# Patient Record
Sex: MALE | Race: WHITE | NOT HISPANIC OR LATINO | Employment: FULL TIME | URBAN - NONMETROPOLITAN AREA
[De-identification: names, ages, dates, MRNs, and addresses within clinical notes are randomized per-mention and may not be internally consistent; named-entity substitution may affect disease eponyms.]

---

## 2017-04-11 ENCOUNTER — HOSPITAL ENCOUNTER (EMERGENCY)
Facility: HOSPITAL | Age: 27
Discharge: HOME | End: 2017-04-11
Attending: NURSE PRACTITIONER | Admitting: NURSE PRACTITIONER
Payer: COMMERCIAL

## 2017-04-11 VITALS
TEMPERATURE: 97 F | HEART RATE: 73 BPM | DIASTOLIC BLOOD PRESSURE: 82 MMHG | SYSTOLIC BLOOD PRESSURE: 137 MMHG | OXYGEN SATURATION: 98 % | RESPIRATION RATE: 16 BRPM

## 2017-04-11 DIAGNOSIS — S39.012A STRAIN OF LUMBAR REGION, INITIAL ENCOUNTER: ICD-10-CM

## 2017-04-11 DIAGNOSIS — M62.838 MUSCLE SPASM: ICD-10-CM

## 2017-04-11 PROCEDURE — 99213 OFFICE O/P EST LOW 20 MIN: CPT | Performed by: NURSE PRACTITIONER

## 2017-04-11 PROCEDURE — 72100 X-RAY EXAM L-S SPINE 2/3 VWS: CPT | Mod: TC

## 2017-04-11 PROCEDURE — 25000128 H RX IP 250 OP 636: Performed by: NURSE PRACTITIONER

## 2017-04-11 PROCEDURE — 99214 OFFICE O/P EST MOD 30 MIN: CPT | Mod: 25

## 2017-04-11 PROCEDURE — 96372 THER/PROPH/DIAG INJ SC/IM: CPT

## 2017-04-11 RX ORDER — KETOROLAC TROMETHAMINE 10 MG/1
10 TABLET, FILM COATED ORAL EVERY 6 HOURS PRN
Qty: 20 TABLET | Refills: 0 | Status: SHIPPED | OUTPATIENT
Start: 2017-04-11 | End: 2017-05-31

## 2017-04-11 RX ORDER — CYCLOBENZAPRINE HCL 10 MG
10 TABLET ORAL 3 TIMES DAILY PRN
Qty: 15 TABLET | Refills: 0 | Status: SHIPPED | OUTPATIENT
Start: 2017-04-11 | End: 2017-04-17

## 2017-04-11 RX ORDER — KETOROLAC TROMETHAMINE 30 MG/ML
60 INJECTION, SOLUTION INTRAMUSCULAR; INTRAVENOUS ONCE
Status: COMPLETED | OUTPATIENT
Start: 2017-04-11 | End: 2017-04-11

## 2017-04-11 RX ADMIN — KETOROLAC TROMETHAMINE 60 MG: 30 INJECTION, SOLUTION INTRAMUSCULAR; INTRAVENOUS at 12:20

## 2017-04-11 ASSESSMENT — ENCOUNTER SYMPTOMS
CHILLS: 0
APPETITE CHANGE: 0
PSYCHIATRIC NEGATIVE: 1
TROUBLE SWALLOWING: 0
DIARRHEA: 0
FEVER: 0
COUGH: 0
VOMITING: 0
BACK PAIN: 1
ACTIVITY CHANGE: 1
NAUSEA: 0
FLANK PAIN: 0
DYSURIA: 0

## 2017-04-11 NOTE — LETTER
[facility]  [address] [address]  Phone: [phone]    April 11, 2017        Roberto Meza  [address] [place] [id number]          To whom it may concern:    RE: Roberto TOBIAS Meza    Patient was seen and treated today at our clinic.    Please excuse from work on 4-11-17.       Sincerely,        AIYANA Lopez  4/11/2017  1:02 PM  [facility]

## 2017-04-11 NOTE — ED NOTES
No known injury states back has been sore/stiff for past few days and woke up today with increased pain rates pain 8/10 no meds taken for pain

## 2017-04-11 NOTE — ED AVS SNAPSHOT
[facility]    [address] [place] [id number]    Phone:  [phone]                                       Roberto Meza   MRN: [id number]    Department:  [facility]   Date of Visit:  4/11/2017           After Visit Summary Signature Page     I have received my discharge instructions, and my questions have been answered. I have discussed any challenges I see with this plan with the nurse or doctor.    ..........................................................................................................................................  Patient/Patient Representative Signature      ..........................................................................................................................................  Patient Representative Print Name and Relationship to Patient    ..................................................               ................................................  Date                                            Time    ..........................................................................................................................................  Reviewed by Signature/Title    ...................................................              ..............................................  Date                                                            Time

## 2017-04-11 NOTE — DISCHARGE INSTRUCTIONS
Take Toradol as ordered for pain. NO additional NSAIDS (ibuprfen, aspirin, naprosyn, etc..)  when taking.   Take Flexeril as directed for muscle spasm. Do not drive or participate in activities that require alertness when taking.   Apply ice to lower back for 20 minutes every 1-2 hours. Protect skin.   Work note.   Follow up with PCP in 10 days.   Return to urgent care or emergency department with any increase in symptoms or concerns.

## 2017-04-11 NOTE — ED PROVIDER NOTES
History     Chief Complaint   Patient presents with     Back Pain     lumbar back, started yesterday, worse today, no known injury     The history is provided by the patient. No  was used.     Roberto Meza is a 26 year old male who presents with lower back pain that started 2 days ago. Pain started when he was walking. Pain started out as stiffness. Pain has been coming and going. He's been seeing a chiropractor off and on for his back. His last visit to the chiropractor was this am. No previous back injury. Denies injury or trauma. Denies numbness or tingling down legs. Nothing seems to make the pain better or worse. Denies fever, chills, or night sweats. Eating and drinking well. Bowel and bladder are working well. Denies incontinence of bowel or bladder. Denies saddle paresthesias. He works as a .     I have reviewed the Medications, Allergies, Past Medical and Surgical History, and Social History in the Epic system.    Review of Systems   Constitutional: Positive for activity change. Negative for appetite change, chills and fever.   HENT: Negative for congestion and trouble swallowing.    Respiratory: Negative for cough.    Gastrointestinal: Negative for diarrhea, nausea and vomiting.   Genitourinary: Negative for dysuria and flank pain.   Musculoskeletal: Positive for back pain.   Skin: Negative for rash.   Psychiatric/Behavioral: Negative.        Physical Exam   BP: 137/82  Pulse: 73  Temp: 97  F (36.1  C)  Resp: 16  SpO2: 98 %  Physical Exam   Constitutional: He is oriented to person, place, and time. He appears well-developed and well-nourished. No distress.   HENT:   Head: Normocephalic.   Mouth/Throat: Oropharynx is clear and moist. No oropharyngeal exudate.   Neck: Normal range of motion. Neck supple.   Cardiovascular: Normal rate, regular rhythm, normal heart sounds and intact distal pulses.    No murmur heard.  Pulmonary/Chest: Effort normal. No  respiratory distress. He has no wheezes. He has no rales.   Abdominal: Soft. He exhibits no distension. There is no tenderness. There is no rebound and no guarding.   Genitourinary:   Genitourinary Comments: Negative bilateral CVA tenderness.    Musculoskeletal: He exhibits tenderness. He exhibits no edema or deformity.   ROM and CMS intact to bilateral lower extremities. No step offs or tenderness to spinal column. No erythema, rash, or bruising to posterior back. Tenderness on palpation to right lateral L4-L5 region. Pain is reproducible. Flexion and extension intact to back. Bilateral dorsalis pedal pulses +2.    Lymphadenopathy:     He has no cervical adenopathy.   Neurological: He is alert and oriented to person, place, and time.   Skin: Skin is warm and dry. No rash noted. He is not diaphoretic.   Psychiatric: He has a normal mood and affect. His behavior is normal.   Nursing note and vitals reviewed.      ED Course     ED Course     Procedures  I personally reviewed the x-rays and there is NO fracture or dislocation. Radiology review pending and nurse will notify patient if there is any change in the treatment plan.    Medications   ketorolac (TORADOL) injection 60 mg (60 mg Intramuscular Given 4/11/17 1220)     Monitored for 20 minutes and tolerated well.       Assessments & Plan (with Medical Decision Making)     Discussed plan of care. He verbalized understanding. All questions answered.     I have reviewed the nursing notes.    I have reviewed the findings, diagnosis, plan and need for follow up with the patient.  Discharged in stable condition.     Discharge Medication List as of 4/11/2017  1:02 PM      START taking these medications    Details   cyclobenzaprine (FLEXERIL) 10 MG tablet Take 1 tablet (10 mg) by mouth 3 times daily as needed for muscle spasms, Disp-15 tablet, R-0, E-Prescribe      ketorolac (TORADOL) 10 MG tablet Take 1 tablet (10 mg) by mouth every 6 hours as needed for moderate pain,  Disp-20 tablet, R-0, E-Prescribe             Final diagnoses:   Strain of lumbar region, initial encounter   Muscle spasm     Take Toradol as ordered for pain. NO additional NSAIDS (ibuprfen, aspirin, naprosyn, etc..)  when taking.   Take Flexeril as directed for muscle spasm. Do not drive or participate in activities that require alertness when taking.   Apply ice to lower back for 20 minutes every 1-2 hours. Protect skin.   Work note.   Follow up with PCP in 10 days.   Return to urgent care or emergency department with any increase in symptoms or concerns.     AIYANA Lopez  4/11/2017  11:31 AM  [facility]       Kat Juan, BRADEN  04/18/17 [id number]

## 2017-05-31 ENCOUNTER — OFFICE VISIT (OUTPATIENT)
Dept: FAMILY MEDICINE | Facility: OTHER | Age: 27
End: 2017-05-31
Attending: NURSE PRACTITIONER
Payer: COMMERCIAL

## 2017-05-31 VITALS
OXYGEN SATURATION: 97 % | BODY MASS INDEX: 26.42 KG/M2 | RESPIRATION RATE: 18 BRPM | WEIGHT: 217 LBS | TEMPERATURE: 98.9 F | HEART RATE: 91 BPM | SYSTOLIC BLOOD PRESSURE: 112 MMHG | HEIGHT: 76 IN | DIASTOLIC BLOOD PRESSURE: 72 MMHG

## 2017-05-31 DIAGNOSIS — M54.50 BILATERAL LOW BACK PAIN WITHOUT SCIATICA, UNSPECIFIED CHRONICITY: Primary | ICD-10-CM

## 2017-05-31 PROCEDURE — 99213 OFFICE O/P EST LOW 20 MIN: CPT | Performed by: NURSE PRACTITIONER

## 2017-05-31 RX ORDER — PREDNISONE 20 MG/1
40 TABLET ORAL DAILY
Qty: 10 TABLET | Refills: 0 | Status: SHIPPED | OUTPATIENT
Start: 2017-05-31 | End: 2017-06-05

## 2017-05-31 ASSESSMENT — PAIN SCALES - GENERAL: PAINLEVEL: MILD PAIN (3)

## 2017-05-31 NOTE — PROGRESS NOTES
SUBJECTIVE:                                                    Roberto Meza is a 26 year old male who presents to clinic today for the following health issues:      Back Pain      Duration: years, worse in last month        Specific cause: laying and leaning over make it worse    Description:   Location of pain: low back bilateral and middle of back bilateral  Character of pain: sharp, stabbing, burning and intermittent  Pain radiation:none  New numbness or weakness in legs, not attributed to pain:  no     Intensity: Currently 3/10, At its worst 7/10    History:   Pain interferes with job: YES, machanic  History of back problems: recurrent self limited episodes of low back pain in the past  Any previous MRI or X-rays: in past not sure where  Sees a specialist for back pain:  Chiropractor  Therapies tried without relief: has flexeril not taking, toradol helped but out and NSAIDs    Alleviating factors:   Improved by: moving around and better posture  Heat helps       Precipitating factors:  Worsened by: Lying Flat    Functional and Psychosocial Screen (Jane STarT Back):      Not performed today       Accompanying Signs & Symptoms:  Risk of Fracture:  None  Risk of Cauda Equina:  None  Risk of Infection:  None  Risk of Cancer:  None  Risk of Ankylosing Spondylitis:  Onset at age <35, male, AND morning back stiffness. no                          Problem list and histories reviewed & adjusted, as indicated.  Additional history: as documented    Patient Active Problem List   Diagnosis     Cat bite     Past Surgical History:   Procedure Laterality Date     growth removal[  2001    left leg       Social History   Substance Use Topics     Smoking status: Current Some Day Smoker     Packs/day: 0.25     Types: Pipe     Smokeless tobacco: Not on file     Alcohol use No      Comment: drinks once a week     History reviewed. No pertinent family history.      No current outpatient prescriptions on file.     Allergies  "  Allergen Reactions     Bees        Reviewed and updated as needed this visit by clinical staff  Tobacco  Allergies  Meds  Med Hx  Surg Hx  Fam Hx  Soc Hx      Reviewed and updated as needed this visit by Provider         ROS:  C: NEGATIVE for fever, chills, change in weight  INTEGUMENTARY/SKIN: NEGATIVE for worrisome rashes, moles or lesions  E/M: NEGATIVE for ear, mouth and throat problems  R: NEGATIVE for significant cough or SOB  CV: NEGATIVE for chest pain, palpitations or peripheral edema  GI: NEGATIVE for nausea, abdominal pain, heartburn, or change in bowel habits  : negative for dysuria, hematuria, decreased urinary stream, erectile dysfunction  MUSCULOSKELETAL: low back pain  NEURO: NEGATIVE for weakness, dizziness or paresthesias  PSYCHIATRIC: NEGATIVE for changes in mood or affect    OBJECTIVE:                                                    /72 (BP Location: Left arm, Patient Position: Chair, Cuff Size: Adult Large)  Pulse 91  Temp 98.9  F (37.2  C) (Tympanic)  Resp 18  Ht 6' 4\" (1.93 m)  Wt 217 lb (98.4 kg)  SpO2 97%  BMI 26.41 kg/m2  Body mass index is 26.41 kg/(m^2).   GENERAL: healthy, alert and no distress  NECK: no adenopathy, no asymmetry, masses, or scars and thyroid normal to palpation  RESP: lungs clear to auscultation - no rales, rhonchi or wheezes  CV: regular rate and rhythm, normal S1 S2, no S3 or S4, no murmur, click or rub, no peripheral edema and peripheral pulses strong  ABDOMEN: soft, nontender, no hepatosplenomegaly, no masses and bowel sounds normal  MS: no edema, peripheral pulses normal, tenderness to palpation bilateral lower back and gait normal, no ataxia  SKIN: no suspicious lesions or rashes  NEURO: Normal strength and tone, mentation intact and speech normal  Comprehensive back pain exam:  Tenderness of bilateral lower back not on the spine, Range of motion not limited by pain, Lower extremity strength functional and equal on both sides and able to " walk on heels and toes and Straight leg raise negative bilaterally  PSYCH: mentation appears normal, affect normal/bright    Diagnostic Test Results:  Results for orders placed or performed during the hospital encounter of 04/11/17   Lumbar spine XR, 2-3 views    Narrative    LUMBAR SPINE SERIES    FINDINGS:  There is straightening of the usual lumbar lordosis.  There  is mild-to-moderate narrowing of the L5-S1 disk space.  There is grade  1 retrolisthesis of L5 compared to S1.  There is no evidence for  spondylolysis.  The remainder of the disk spaces are normally  maintained.  There is no evidence for fracture.  There is spina bifida  occulta of S1.  The sacroiliac joints appear normally maintained.    IMPRESSION:  NARROWING OF THE L5-S1 DISK SPACE WITH GRADE 1  RETROLISTHESIS OF L5 COMPARED TO S1.  Exam Date: Apr 11, 2017 11:47:09 AM  Author: JOSE HUNTER  This report is final and signed          ASSESSMENT:                                                        PLAN:                                                    ASSESSMENT / PLAN:  (M54.5) Bilateral low back pain without sciatica, unspecified chronicity  (primary encounter diagnosis)  Comment:   Plan:  predniSONE (DELTASONE) 20 MG tablet          -Daily stretching - try some yoga -  -Prednisone 40 mg daily for 5 days   -Can take tylenol up to 3000 mg daily  -Do not take advil or other NSAID while on prednisone when done with prednisone can take ibuprofen 600- 800 mg 3 times a day  -Ice and heat 3 -4 times a day   -try a massage  -can also go back chiropractor   -consider physical therapy if other options do not work        Follow up in a couple weeks if no improvement           Jes Fried, GEORGET[facility]

## 2017-05-31 NOTE — PATIENT INSTRUCTIONS
Back Care Tips    Caring for your back  These are things you can do to prevent a recurrence of acute back pain and to reduce symptoms from chronic back pain:    Maintain a healthy weight. If you are overweight, losing weight will help most types of back pain.    Exercise is an important part of recovery from most types of back pain. The back is supported by the muscles behind and in front of the spine. This means both the back muscles and the abdominal muscles must be strengthened to provide better support for your spine.     Swimming and brisk walking are good overall exercises to improve your fitness level.    Practice safe lifting methods (below).    Practice good posture when sitting, standing and walking. Avoid prolonged sitting. This puts more stress on the lower back than standing or walking.    Wear quality shoes with sufficient arch support. Foot and ankle alignment can affect back symptoms. Women should avoid high heels.    Therapeutic massage can help  relax the back muscles without stretching them.    During the first 24 to 72 hours after an acute injury or flare-up of chronic back pain, apply an ice pack to the painful area for 20 minutes and then remove it for 20 minutes over a period of 60 to 90 minutes or several times a day. As a safety precaution, do not use a heating pad at bedtime. Sleeping on a heating pad can lead to skin burns or tissue damage.    Ice and heat therapies can be alternated.  Medications  Talk to your doctor before using medications, especially if you have other medical problems or are taking other medicines.    You may use acetaminophen or ibuprofen to control pain, unless other pain medicine was prescribed. If you have chronic conditions like diabetes, liver or kidney disease, stomach ulcers or gastrointestinal bleeding, or are taking blood thinners, talk with your doctor before taking any meidcations.    Be careful if you are given prescription pain medicines, narcotics, or  medication for muscle spasm. They can cause drowsiness, affect your coordination, reflexes and judgment. Do not drive or operate heavy machinery.  Lumbar stretch  Here is a simple stretching exercise that will help relax muscle spasm and keep your back more limber. If exercise makes your back pain worse, don t do it.    Lie on your back with your knees bent and both feet on the ground.    Slowly raise your left knee to your chest as you flatten your lower back against the floor. Hold for 5 seconds.    Relax and repeat the exercise with your right knee.    Do 10 of these exercises for each leg.  Safe lifting method    Don t bend over at the waist to lift an object off the floor.  Instead, bend your knees and hips in a squat.     Keep your back and head upright    Hold the object close to your body, directly in front of you.    Straighten your legs to lift the object.     Lower the object to the floor in the reverse fashion.    If you must slide something across the floor, push it.  Posture tips  Sitting  Sit in chairs with straight backs or low-back support. rKeep your k nees lower than your hip, with your feet flat on the floor.  When driving, sit up straight. Adjust the seat forward so you are not leaning toward the steering wheel.  A small pillow or rolled towel behind your lower back may help if you are driving long distances.   Standing  When standing for long periods, shift most of your weight to one leg at a time. Alternate legs every few minutes.   Sleeping  The best way to sleep is on your side with your knees bent. Put a low pillow under your head to support your neck in a neutral spine position. Avoid thick pillows that bend your neck to one side. Put a pillow between your legs to further relax your lower back. If you sleep on your back, put pillows under your knees to support your legs in a slightly flexed position. Use a firm mattress. If your mattress sags, replace it, or use a 1/2-inch plywood board  under the mattress to add support.  Follow-up care  Follow up with your health care provider or as directed by our staff.  If X-rays, a CT scan or an MRI scan were taken, they will be reviewed by a radiologist. You will be notified of any new findings that may affect your care.  Call 911  Seek emergency medical care if any of the following occur:    Trouble breathing    Confusion    Very drowsy or trouble breathing    Fainting or loss of consciousness    Rapid or very slow heart rate    Loss of  bwel or bladder control  When to seek medical care  Call your health care provider if any of the following occur:    Pain becomes worse or spreads to your arms or legs    Weakness or numbness in one or both arms or legs    Numbness in the groin area    [id number] [employer]. [address] [place] [address], [place] [address]. All rights reserved. This information is not intended as a substitute for professional medical care. Always follow your healthcare professional's instructions.    -Daily stretching - try some yoga -  -Prednisone 40 mg daily for 5 days   -Can take tylenol up to 3000 mg daily  -Do not take advil or other NSAID while on prednisone when done with prednisone can take ibuprofen 600- 800 mg 3 times a day  -Ice and heat 3 -4 times a day   -try a massage  -can also go back chiropractor   -consider physical therapy if other options do not work

## 2017-05-31 NOTE — MR AVS SNAPSHOT
After Visit Summary   5/31/2017    Roberto Meza    MRN: [id number]           Patient Information     Date Of Birth          1990        Visit Information        Provider Department      5/31/2017 1:00 PM Jes Fried APRN [facility]        Today's Diagnoses     Bilateral low back pain without sciatica, unspecified chronicity    -  1      Care Instructions      Back Care Tips    Caring for your back  These are things you can do to prevent a recurrence of acute back pain and to reduce symptoms from chronic back pain:    Maintain a healthy weight. If you are overweight, losing weight will help most types of back pain.    Exercise is an important part of recovery from most types of back pain. The back is supported by the muscles behind and in front of the spine. This means both the back muscles and the abdominal muscles must be strengthened to provide better support for your spine.     Swimming and brisk walking are good overall exercises to improve your fitness level.    Practice safe lifting methods (below).    Practice good posture when sitting, standing and walking. Avoid prolonged sitting. This puts more stress on the lower back than standing or walking.    Wear quality shoes with sufficient arch support. Foot and ankle alignment can affect back symptoms. Women should avoid high heels.    Therapeutic massage can help  relax the back muscles without stretching them.    During the first 24 to 72 hours after an acute injury or flare-up of chronic back pain, apply an ice pack to the painful area for 20 minutes and then remove it for 20 minutes over a period of 60 to 90 minutes or several times a day. As a safety precaution, do not use a heating pad at bedtime. Sleeping on a heating pad can lead to skin burns or tissue damage.    Ice and heat therapies can be alternated.  Medications  Talk to your doctor before using medications, especially if you have other medical  problems or are taking other medicines.    You may use acetaminophen or ibuprofen to control pain, unless other pain medicine was prescribed. If you have chronic conditions like diabetes, liver or kidney disease, stomach ulcers or gastrointestinal bleeding, or are taking blood thinners, talk with your doctor before taking any meidcations.    Be careful if you are given prescription pain medicines, narcotics, or medication for muscle spasm. They can cause drowsiness, affect your coordination, reflexes and judgment. Do not drive or operate heavy machinery.  Lumbar stretch  Here is a simple stretching exercise that will help relax muscle spasm and keep your back more limber. If exercise makes your back pain worse, don t do it.    Lie on your back with your knees bent and both feet on the ground.    Slowly raise your left knee to your chest as you flatten your lower back against the floor. Hold for 5 seconds.    Relax and repeat the exercise with your right knee.    Do 10 of these exercises for each leg.  Safe lifting method    Don t bend over at the waist to lift an object off the floor.  Instead, bend your knees and hips in a squat.     Keep your back and head upright    Hold the object close to your body, directly in front of you.    Straighten your legs to lift the object.     Lower the object to the floor in the reverse fashion.    If you must slide something across the floor, push it.  Posture tips  Sitting  Sit in chairs with straight backs or low-back support. rKeep your k nees lower than your hip, with your feet flat on the floor.  When driving, sit up straight. Adjust the seat forward so you are not leaning toward the steering wheel.  A small pillow or rolled towel behind your lower back may help if you are driving long distances.   Standing  When standing for long periods, shift most of your weight to one leg at a time. Alternate legs every few minutes.   Sleeping  The best way to sleep is on your side with  your knees bent. Put a low pillow under your head to support your neck in a neutral spine position. Avoid thick pillows that bend your neck to one side. Put a pillow between your legs to further relax your lower back. If you sleep on your back, put pillows under your knees to support your legs in a slightly flexed position. Use a firm mattress. If your mattress sags, replace it, or use a 1/2-inch plywood board under the mattress to add support.  Follow-up care  Follow up with your health care provider or as directed by our staff.  If X-rays, a CT scan or an MRI scan were taken, they will be reviewed by a radiologist. You will be notified of any new findings that may affect your care.  Call 911  Seek emergency medical care if any of the following occur:    Trouble breathing    Confusion    Very drowsy or trouble breathing    Fainting or loss of consciousness    Rapid or very slow heart rate    Loss of  bwel or bladder control  When to seek medical care  Call your health care provider if any of the following occur:    Pain becomes worse or spreads to your arms or legs    Weakness or numbness in one or both arms or legs    Numbness in the groin area    2759-6753 [employer]. [address] [place] [address]. All rights reserved. This information is not intended as a substitute for professional medical care. Always follow your healthcare professional's instructions.    -Daily stretching - try some yoga -  -Prednisone 40 mg daily for 5 days   -Can take tylenol up to 3000 mg daily  -Do not take advil or other NSAID while on prednisone when done with prednisone can take ibuprofen 600- 800 mg 3 times a day  -Ice and heat 3 -4 times a day   -try a massage  -can also go back chiropractor   -consider physical therapy if other options do not work          Follow-ups after your visit        Follow-up notes from your care team     Return in about 2 weeks (around 6/14/2017), or if symptoms worsen or fail to  "improve.      Who to contact     If you have questions or need follow up information about today's clinic visit or your schedule please contact [facility] [place] directly at [phone].  Normal or non-critical lab and imaging results will be communicated to you by MyChart, letter or phone within 4 business days after the clinic has received the results. If you do not hear from us within 7 days, please contact the clinic through MyChart or phone. If you have a critical or abnormal lab result, we will notify you by phone as soon as possible.  Submit refill requests through [employer] or call your pharmacy and they will forward the refill request to us. Please allow 3 business days for your refill to be completed.          Additional Information About Your Visit        [employer] Information     [employer] lets you send messages to your doctor, view your test results, renew your prescriptions, schedule appointments and more. To sign up, go to [email or web address] . Click on \"Log in\" on the left side of the screen, which will take you to the Welcome page. Then click on \"Sign up Now\" on the right side of the page.     You will be asked to enter the access code listed below, as well as some personal information. Please follow the directions to create your username and password.     Your access code is: [email or web address]  Expires: 7/10/2017  1:01 PM     Your access code will  in 90 days. If you need help or a new code, please call your [facility] clinic or [phone].        Care EveryWhere ID     This is your Care EveryWhere ID. This could be used by other organizations to access your [facility] medical records  [id number]        Your Vitals Were     Pulse Temperature Respirations Height Pulse Oximetry BMI (Body Mass Index)    91 98.9  F (37.2  C) (Tympanic) 18 6' 4\" (1.93 m) 97% 26.41 kg/m2       Blood Pressure from Last 3 Encounters:   17 112/72   17 137/82   16 133/89    Weight from Last 3 " Encounters:   05/31/17 217 lb (98.4 kg)   09/25/13 214 lb (97.1 kg)              Today, you had the following     No orders found for display         Today's Medication Changes          These changes are accurate as of: 5/31/17  1:50 PM.  If you have any questions, ask your nurse or doctor.               Start taking these medicines.        Dose/Directions    predniSONE 20 MG tablet   Commonly known as:  DELTASONE   Used for:  Bilateral low back pain without sciatica, unspecified chronicity   Started by:  Jes Fried APRN CNP        Dose:  40 mg   Take 2 tablets (40 mg) by mouth daily for 5 days   Quantity:  10 tablet   Refills:  0            Where to get your medicines      These medications were sent to [facility], [place] - [address] AT [facility] [address], [place] [address]     Phone:  [phone]     predniSONE 20 MG tablet                Primary Care Provider    None       No address on file        Thank you!     Thank you for choosing [facility]  for your care. Our goal is always to provide you with excellent care. Hearing back from our patients is one way we can continue to improve our services. Please take a few minutes to complete the written survey that you may receive in the mail after your visit with us. Thank you!             Your Updated Medication List - Protect others around you: Learn how to safely use, store and throw away your medicines at www.disposemymeds.org.          This list is accurate as of: 5/31/17  1:50 PM.  Always use your most recent med list.                   Brand Name Dispense Instructions for use    predniSONE 20 MG tablet    DELTASONE    10 tablet    Take 2 tablets (40 mg) by mouth daily for 5 days

## 2017-05-31 NOTE — NURSING NOTE
"Chief Complaint   Patient presents with     Pain     backmid to low back central       Initial /72 (BP Location: Left arm, Patient Position: Chair, Cuff Size: Adult Large)  Pulse 91  Temp 98.9  F (37.2  C) (Tympanic)  Resp 18  Ht 6' 4\" (1.93 m)  Wt 217 lb (98.4 kg)  SpO2 97%  BMI 26.41 kg/m2 Estimated body mass index is 26.41 kg/(m^2) as calculated from the following:    Height as of this encounter: 6' 4\" (1.93 m).    Weight as of this encounter: 217 lb (98.4 kg).  Medication Reconciliation: complete   Jackie Gomez      "

## 2017-07-13 ENCOUNTER — OFFICE VISIT (OUTPATIENT)
Dept: FAMILY MEDICINE | Facility: OTHER | Age: 27
End: 2017-07-13
Attending: NURSE PRACTITIONER
Payer: OTHER MISCELLANEOUS

## 2017-07-13 VITALS
RESPIRATION RATE: 20 BRPM | SYSTOLIC BLOOD PRESSURE: 120 MMHG | HEIGHT: 76 IN | BODY MASS INDEX: 26.91 KG/M2 | DIASTOLIC BLOOD PRESSURE: 68 MMHG | HEART RATE: 85 BPM | OXYGEN SATURATION: 97 % | TEMPERATURE: 97.5 F | WEIGHT: 221 LBS

## 2017-07-13 DIAGNOSIS — S86.812D PATELLAR TENDON STRAIN, LEFT, SUBSEQUENT ENCOUNTER: Primary | ICD-10-CM

## 2017-07-13 PROCEDURE — 99213 OFFICE O/P EST LOW 20 MIN: CPT | Performed by: NURSE PRACTITIONER

## 2017-07-13 RX ORDER — IBUPROFEN 800 MG/1
800 TABLET, FILM COATED ORAL 3 TIMES DAILY
Qty: 90 TABLET | Refills: 1 | Status: SHIPPED | OUTPATIENT
Start: 2017-07-13 | End: 2020-09-03

## 2017-07-13 ASSESSMENT — PAIN SCALES - GENERAL: PAINLEVEL: MILD PAIN (3)

## 2017-07-13 NOTE — NURSING NOTE
"Chief Complaint   Patient presents with     Musculoskeletal Problem     left knee injury work comp was sitting working on things and stood up and left knee snapped sore and achy increases with movement        Initial /68 (BP Location: Right arm, Patient Position: Chair, Cuff Size: Adult Large)  Pulse 85  Temp 97.5  F (36.4  C) (Tympanic)  Resp 20  Ht 6' 4\" (1.93 m)  Wt 221 lb (100.2 kg)  SpO2 97%  BMI 26.9 kg/m2 Estimated body mass index is 26.9 kg/(m^2) as calculated from the following:    Height as of this encounter: 6' 4\" (1.93 m).    Weight as of this encounter: 221 lb (100.2 kg).  Medication Reconciliation: complete   Pamela M Lechevalier LPN      "

## 2017-07-13 NOTE — PROGRESS NOTES
Occupational Visit   Chief Complaint   Patient presents with     Musculoskeletal Problem     left knee injury work comp was sitting working on things and stood up and left knee snapped sore and achy increases with movement        Subjective:  Roberto Meza, 26 year old, male is seen in follow-up of patellar strain.  The date of injury is July 10/19607.  The patient is employed at [employer].    He was working in a seated position; he went to stand up and felt a pop.  Pain was immediate and present since.    Onset of symptoms: acute onset  Location of symptoms:  Anterior knee  Timing of symptoms: not there upon waking, gradually progressing throughout the day  Duration: as above  Cause/Injury:  As above  Quality of symptoms: dull ache that will exacerbate to stabbing pain  Associated symptoms: denies redness, swelling.    Severity of symptoms:    3/10   Will exacerbate to 7-10  Radiation: none  Aggravating factors:  Bent to long  Alleviating factors:  Straightening, has been taking naproxen as needed, ice and purchased a brace which is helping somewhat.              Allergies   Allergen Reactions     Bees          Review of Systems:  Constitutional, HEENT, cardiovascular, pulmonary, gi and gu systems are negative, except as otherwise noted.      OBJECTIVE:  Vitals: B/P: 120/68, T: 97.5, P: 85, R: 20      Exam:  GENERAL: healthy, alert, well nourished, well hydrated, no distress  MS: extremities- no gross deformities noted, no edema  Knee Exam: Inspection: AP/lateral alignment normal, No effusion, No quad atrophy  Tender: lateral patellar facet, medial patellar facet, inferior pole patella, patella tendon  Non-tender: MCL, LCL, lateral joint line, medial joint line  Active Range of Motion: full flexion  Special tests: normal Valgus stress test, normal Varus, negative drawer, negative Dima's   PSYCH: Alert and oriented times 3; speech- coherent , normal rate and volume; able to articulate logical thoughts, able to  abstract reason, no tangential thoughts, no hallucinations or delusions, affect- normal        ASSESSMENT/PLAN:  1. Patellar tendon strain, left, subsequent encounter  symptomatic  - ibuprofen (ADVIL/MOTRIN) 800 MG tablet; Take 1 tablet (800 mg) by mouth 3 times daily  Dispense: 90 tablet; Refill: 1  - continue brace, but get one with patellar support  - ice  - workability completed  - may consider referral to ortho (cortisone injections) for evaluation if no improvement  - may consider MRI if no improvement    Follow-up if no improvement, soft tissue injuries may last for up to 4-6 weeks    Helen Fleming,   Certified Adult Nurse Practitioner  [phone]

## 2017-07-13 NOTE — MR AVS SNAPSHOT
After Visit Summary   7/13/2017    Roberto Meza    MRN: [id number]           Patient Information     Date Of Birth          1990        Visit Information        Provider Department      7/13/2017 3:00 PM Helen Fleming NP [facility]        Today's Diagnoses     Patellar tendon strain, left, subsequent encounter    -  1      Care Instructions      ASSESSMENT/PLAN:  1. Patellar tendon strain, left, subsequent encounter  symptomatic  - ibuprofen (ADVIL/MOTRIN) 800 MG tablet; Take 1 tablet (800 mg) by mouth 3 times daily  Dispense: 90 tablet; Refill: 1  - continue brace, but get one with patellar support  - ice  - workability completed  - may consider referral to ortho (cortisone injections) for evaluation if no improvement  - may consider MRI if no improvement    Follow-up if no improvement, soft tissue injuries may last for up to 4-6 weeks    Helen Fleming,   Certified Adult Nurse Practitioner  [phone]              Follow-ups after your visit        Who to contact     If you have questions or need follow up information about today's clinic visit or your schedule please contact [facility] directly at [phone].  Normal or non-critical lab and imaging results will be communicated to you by MyChart, letter or phone within 4 business days after the clinic has received the results. If you do not hear from us within 7 days, please contact the clinic through [employer] or phone. If you have a critical or abnormal lab result, we will notify you by phone as soon as possible.  Submit refill requests through [employer] or call your pharmacy and they will forward the refill request to us. Please allow 3 business days for your refill to be completed.          Additional Information About Your Visit        MyChart Information     [employer] lets you send messages to your doctor, view your test results, renew your prescriptions, schedule appointments and more. To  "sign up, go to [email or web address] . Click on \"Log in\" on the left side of the screen, which will take you to the Welcome page. Then click on \"Sign up Now\" on the right side of the page.     You will be asked to enter the access code listed below, as well as some personal information. Please follow the directions to create your username and password.     Your access code is: [email or web address]  Expires: 10/11/2017  3:53 PM     Your access code will  in 90 days. If you need help or a new code, please call your [facility] clinic or [phone].        Care EveryWhere ID     This is your Care EveryWhere ID. This could be used by other organizations to access your [facility] medical records  [id number]        Your Vitals Were     Pulse Temperature Respirations Height Pulse Oximetry BMI (Body Mass Index)    85 97.5  F (36.4  C) (Tympanic) 20 6' 4\" (1.93 m) 97% 26.9 kg/m2       Blood Pressure from Last 3 Encounters:   17 120/68   17 112/72   17 137/82    Weight from Last 3 Encounters:   17 221 lb (100.2 kg)   17 217 lb (98.4 kg)   13 214 lb (97.1 kg)              Today, you had the following     No orders found for display         Today's Medication Changes          These changes are accurate as of: 17  3:53 PM.  If you have any questions, ask your nurse or doctor.               Start taking these medicines.        Dose/Directions    ibuprofen 800 MG tablet   Commonly known as:  ADVIL/MOTRIN   Used for:  Patellar tendon strain, left, subsequent encounter   Started by:  Helen Fleming NP        Dose:  800 mg   Take 1 tablet (800 mg) by mouth 3 times daily   Quantity:  90 tablet   Refills:  1            Where to get your medicines      These medications were sent to [id number] - [place] - [address] AT [facility] [address], [place] [address]     Phone:  [phone]     ibuprofen 800 MG tablet                Primary Care " Provider    None       No address on file        Equal Access to Services     [facility] FAYE : Hadii aad ku hadsarathjanel Zullyshreya, waronnakeya hobsonolmanha, danni danomirzaheriberto mcgill. So [facility] [phone].    ATENCIÓN: Si habla español, tiene a cannon disposición servicios gratuitos de asistencia lingüística. Llame al [phone].    We comply with applicable federal civil rights laws and [place] laws. We do not discriminate on the basis of race, color, national origin, age, disability sex, sexual orientation or gender identity.            Thank you!     Thank you for choosing [facility]  for your care. Our goal is always to provide you with excellent care. Hearing back from our patients is one way we can continue to improve our services. Please take a few minutes to complete the written survey that you may receive in the mail after your visit with us. Thank you!             Your Updated Medication List - Protect others around you: Learn how to safely use, store and throw away your medicines at www.disposemymeds.org.          This list is accurate as of: 7/13/17  3:53 PM.  Always use your most recent med list.                   Brand Name Dispense Instructions for use Diagnosis    ibuprofen 800 MG tablet    ADVIL/MOTRIN    90 tablet    Take 1 tablet (800 mg) by mouth 3 times daily    Patellar tendon strain, left, subsequent encounter

## 2017-07-13 NOTE — PATIENT INSTRUCTIONS
ASSESSMENT/PLAN:  1. Patellar tendon strain, left, subsequent encounter  symptomatic  - ibuprofen (ADVIL/MOTRIN) 800 MG tablet; Take 1 tablet (800 mg) by mouth 3 times daily  Dispense: 90 tablet; Refill: 1  - continue brace, but get one with patellar support  - ice  - workability completed  - may consider referral to ortho (cortisone injections) for evaluation if no improvement  - may consider MRI if no improvement    Follow-up if no improvement, soft tissue injuries may last for up to 4-6 weeks    Helen Fleming,   Certified Adult Nurse Practitioner  [phone]

## 2017-07-13 NOTE — LETTER
REPORT OF WORK COMP    [facility]  [address]  [place] [address]  [phone]      PATIENT DATA    Employee Name: Roberto Meza      : 1990     #: [id number]    Work related injury: Yes  Employer at time of injury: [employer]  Employer contact & phone:   Employed elsewhere? No  Workers' Compensation Carrier/Managed Care Plan:  Unknown    Today's date: 2017  Date of injury: 7/10/2017  Date of first visit: 7/10/17 at [facility] 2017    PROVIDER EVALUATION: Please fill in as needed.  Please give copy to employee for employer.    1. Diagnosis: .  1. Patellar tendon strain, left, subsequent encounter  - ibuprofen (ADVIL/MOTRIN) 800 MG tablet; Take 1 tablet (800 mg) by mouth 3 times daily  Dispense: 90 tablet; Refill: 1  - continue brace, but get one with patellar support  - ice  - workability completed  - may consider referral to ortho (cortisone injections) for evaluation if no improvement  - may consider MRI if no improvement    Follow-up if no improvement, soft tissue injuries may last for up to 4-6 weeks      NOTE: When ordering a medication, [place] Rules require Work Comp or WC on prescriptions.    4. No work from  to .  5. Return to work date: 2017   WITH RESTRICTIONS? Yes, with work restrictions:  Limited repetitive motion, kneeling.  Allow for frequent position changes.  DURATION OF LIMITATIONS: 1 month      RESTRICTIONS: Unlimited unless listed.  Restrictions apply to home and leisure also.  If work restrictions is not available, the employee is totally disabled.    Maximum Medical Improvement (Date):   Any Permanent Partial Disability? 0%    Provider comments: treated with conservative     Helen Fleming   Certified Adult Nurse Practitioner  [phone]      Next appointment: As needed    CC: Employer, Managed Care Plan/Payor, Patient

## 2017-08-08 ENCOUNTER — TELEPHONE (OUTPATIENT)
Dept: FAMILY MEDICINE | Facility: OTHER | Age: 27
End: 2017-08-08

## 2017-08-08 NOTE — TELEPHONE ENCOUNTER
Faxed medical records per adjustors request [facility]  Medical Record Request:    Faxed Office Note dated: 07/13/17    Faxed Work Ability dated:07/13/17    By: MICHAEL Ayala

## 2018-01-01 ENCOUNTER — HOSPITAL ENCOUNTER (EMERGENCY)
Facility: HOSPITAL | Age: 28
Discharge: HOME | End: 2018-01-01
Attending: PHYSICIAN ASSISTANT | Admitting: PHYSICIAN ASSISTANT
Payer: COMMERCIAL

## 2018-01-01 VITALS
OXYGEN SATURATION: 95 % | DIASTOLIC BLOOD PRESSURE: 91 MMHG | TEMPERATURE: 97.7 F | RESPIRATION RATE: 16 BRPM | SYSTOLIC BLOOD PRESSURE: 141 MMHG

## 2018-01-01 DIAGNOSIS — J98.01 ACUTE BRONCHOSPASM: ICD-10-CM

## 2018-01-01 DIAGNOSIS — J32.9 RHINOSINUSITIS: ICD-10-CM

## 2018-01-01 PROCEDURE — 99213 OFFICE O/P EST LOW 20 MIN: CPT | Performed by: PHYSICIAN ASSISTANT

## 2018-01-01 PROCEDURE — G0463 HOSPITAL OUTPT CLINIC VISIT: HCPCS

## 2018-01-01 RX ORDER — BENZONATATE 200 MG/1
200 CAPSULE ORAL 3 TIMES DAILY PRN
Qty: 21 CAPSULE | Refills: 0 | Status: SHIPPED | OUTPATIENT
Start: 2018-01-01 | End: 2018-09-12

## 2018-01-01 RX ORDER — PREDNISONE 20 MG/1
TABLET ORAL
Qty: 10 TABLET | Refills: 0 | Status: SHIPPED | OUTPATIENT
Start: 2018-01-01 | End: 2018-09-12

## 2018-01-01 RX ORDER — FLUTICASONE PROPIONATE 50 MCG
1 SPRAY, SUSPENSION (ML) NASAL 2 TIMES DAILY
Qty: 16 G | Refills: 0 | Status: SHIPPED | OUTPATIENT
Start: 2018-01-01 | End: 2018-01-06

## 2018-01-01 NOTE — ED AVS SNAPSHOT
[facility]    [address] [place] [address]    Phone:  [phone]                                       Roberto Meza   MRN: [id number]    Department:  [facility]   Date of Visit:  1/1/2018           After Visit Summary Signature Page     I have received my discharge instructions, and my questions have been answered. I have discussed any challenges I see with this plan with the nurse or doctor.    ..........................................................................................................................................  Patient/Patient Representative Signature      ..........................................................................................................................................  Patient Representative Print Name and Relationship to Patient    ..................................................               ................................................  Date                                            Time    ..........................................................................................................................................  Reviewed by Signature/Title    ...................................................              ..............................................  Date                                                            Time

## 2018-01-01 NOTE — ED NOTES
Pt presents with a productive cough with yellowish sputum, headache, nasal congestion, fatigued, head feels spacey, muscle aches for 3-4 days.

## 2018-01-01 NOTE — ED AVS SNAPSHOT
[facility]    [address] [address]    Phone:  [phone]                                       Roberto Meza   MRN: [id number]    Department:  [facility]   Date of Visit:  1/1/2018           Patient Information     Date Of Birth          1990        Your diagnoses for this visit were:     Rhinosinusitis        You were seen by Taran Lazar PA.      Follow-up Information     Follow up with No Ref-Primary, Physician.    Why:  3-5        Follow up with [facility].    Specialty:  EMERGENCY MEDICINE    Why:  If symptoms worsen    Contact information:    [address] [place] [address] [phone]    Additional information:    From [place]: Take [address]. Turn left at [address]. Turn left at the first stoplight on [address]. At the first stop sign, take a right onto [address]. Take a left into the parking lot and continue through until you reach the North enterance of the building.       From [place]: Take [address]. Take the [address] ramp towards [place]. Turn left onto [address]. Take a slight right onto [address] [facility]. Turn left at the first stoplight on [address]. At the first stop sign, take a right onto [address]. Take a left into the parking lot and continue through until you reach the North enterance of the building.       From [place]: Take [address]. Take a right at [address]. At the first stop sign, take a right onto [address]. Take a left into the parking lot and continue through until you reach the North enterance of the building.         Discharge Instructions       1. Dry out congestion with flonase (1spray in both nostrils 2x daily for 3-5 days) and prednisone   2. Use a saline spray/Neti Pot/sinus flush (Armen Med Sinus Rinse) 2-3 times daily to irrigate sinuses/mucosal tissue. This dilutes and moves secretions.    3. Tylenol 1000mg 3x daily for pain and fevers as needed.   4. Plenty of fluids and rest as needed.   5. Chew, yawn and speak to help eustachian tubes drain.   6. Tessalon for cough.   7. IF plan above does not work after 2-3 days: Augmentin for sinus infection.      - Consider the following over-the-counter products if you are older than 1 year and not pregnant: honey/chestal for cough relief and sambucus/elderberry for viral upper-respiratory symptoms.      Discharge References/Attachments     SINUSITIS, PREVENTING  (ENGLISH)         Review of your medicines      START taking        Dose / Directions Last dose taken    amoxicillin-clavulanate 875-125 MG per tablet   Commonly known as:  AUGMENTIN   Dose:  1 tablet   Quantity:  14 tablet        Take 1 tablet by mouth 2 times daily for 7 days   Refills:  0        benzonatate 200 MG capsule   Commonly known as:  TESSALON   Dose:  200 mg   Quantity:  21 capsule        Take 1 capsule (200 mg) by mouth 3 times daily as needed for cough   Refills:  0        fluticasone 50 MCG/ACT spray   Commonly known as:  FLONASE   Dose:  1 spray   Quantity:  16 g        Spray 1 spray into both nostrils 2 times daily for 5 days   Refills:  0        predniSONE 20 MG tablet   Commonly known as:  DELTASONE   Quantity:  10 tablet        Take two tablets (= 40mg) each day for 5 (five) days   Refills:  0          Our records show that you are taking the medicines listed below. If these are incorrect, please call your family doctor or clinic.        Dose / Directions Last dose taken    ibuprofen 800 MG tablet   Commonly known as:  ADVIL/MOTRIN   Dose:  800 mg   Quantity:  90 tablet        Take 1 tablet (800 mg) by mouth 3 times daily   Refills:  1                Prescriptions were sent or printed at these locations (4 Prescriptions)                   [facility] - [place] - [address] [facility] [address] [address] [place] [address] [address]    Telephone:  [phone]   Fax:  [phone]  "  Hours:                  E-Prescribed (3 of 4)         predniSONE (DELTASONE) 20 MG tablet               fluticasone (FLONASE) 50 MCG/ACT spray               benzonatate (TESSALON) 200 MG capsule                 Printed at Department/Unit printer (1 of 4)         amoxicillin-clavulanate (AUGMENTIN) 875-125 MG per tablet                Orders Needing Specimen Collection     None      Pending Results     No orders found from 2017 to 2018.            Pending Culture Results     No orders found from 2017 to 2018.            Thank you for choosing [facility]       Thank you for choosing [facility] for your care. Our goal is always to provide you with excellent care. Hearing back from our patients is one way we can continue to improve our services. Please take a few minutes to complete the written survey that you may receive in the mail after you visit with us. Thank you!        [employer] Information     [employer] lets you send messages to your doctor, view your test results, renew your prescriptions, schedule appointments and more. To sign up, go to [email or web address] . Click on \"Log in\" on the left side of the screen, which will take you to the Welcome page. Then click on \"Sign up Now\" on the right side of the page.     You will be asked to enter the access code listed below, as well as some personal information. Please follow the directions to create your username and password.     Your access code is: [email or web address]  Expires: 2018  4:38 PM     Your access code will  in 90 days. If you need help or a new code, please call your [facility] clinic or [phone].        Care EveryWhere ID     This is your Care EveryWhere ID. This could be used by other organizations to access your [facility] medical records  [id number]        Equal Access to Services     JHON MCKINNEY : betzy Perez, heriberto jarrett. So [facility] " [phone].    ATENCIÓN: Si habla español, tiene a cannon disposición servicios gratuitos de asistencia lingüística. Llame al [phone].    We comply with applicable federal civil rights laws and [place] laws. We do not discriminate on the basis of race, color, national origin, age, disability, sex, sexual orientation, or gender identity.            After Visit Summary       This is your record. Keep this with you and show to your community pharmacist(s) and doctor(s) at your next visit.

## 2018-01-01 NOTE — DISCHARGE INSTRUCTIONS
1. Dry out congestion with flonase (1spray in both nostrils 2x daily for 3-5 days) and prednisone   2. Use a saline spray/Neti Pot/sinus flush (Armen Med Sinus Rinse) 2-3 times daily to irrigate sinuses/mucosal tissue. This dilutes and moves secretions.   3. Tylenol 1000mg 3x daily for pain and fevers as needed.   4. Plenty of fluids and rest as needed.   5. Chew, yawn and speak to help eustachian tubes drain.   6. Tessalon for cough.   7. IF plan above does not work after 2-3 days: Augmentin for sinus infection.      - Consider the following over-the-counter products if you are older than 1 year and not pregnant: honey/chestal for cough relief and sambucus/elderberry for viral upper-respiratory symptoms.

## 2018-01-01 NOTE — LETTER
[facility] [address] [place] [address]  Main: [phone]  Dept: [phone]      January 1, 2018      Re: Roberto Meza      TO WHOM IT MAY CONCERN:    Roberto Meza was evaluated in Emergency Department for acute illness starting on 27 December 2017. He has started treatment and I expect his condition to improve in 3-5 days. He may return to work 24 hrs after fevers have resolved.  Please excuse Roberto from any missed work.          Sincerely,      Taran Lazar PA-C   1/1/2018   4:40 PM

## 2018-01-01 NOTE — ED PROVIDER NOTES
History     Chief Complaint   Patient presents with     Cough     c/o cough, h/a and cold symptoms     The history is provided by the patient. No  was used.     Roberto Meza is a 27 year old male who presents with4-5 days fevers, congestion, headache, sinus pressure, fatigue. sore throat the first 2 days, but this has resolved. Cough is now productive. Pt reports chest tightness. No Hx asthma.     Problem List:    Patient Active Problem List    Diagnosis Date Noted     Cat bite 11/07/2013     Priority: Medium        Past Medical History:    No past medical history on file.    Past Surgical History:    Past Surgical History:   Procedure Laterality Date     growth removal[  2001    left leg       Family History:    No family history on file.    Social History:  Marital Status:  Single [1]  Social History   Substance Use Topics     Smoking status: Current Some Day Smoker     Packs/day: 0.25     Types: Pipe     Smokeless tobacco: Not on file     Alcohol use No      Comment: drinks once a week        Medications:      predniSONE (DELTASONE) 20 MG tablet   amoxicillin-clavulanate (AUGMENTIN) 875-125 MG per tablet   fluticasone (FLONASE) 50 MCG/ACT spray   benzonatate (TESSALON) 200 MG capsule   ibuprofen (ADVIL/MOTRIN) 800 MG tablet         Review of Systems   Constitutional: Positive for fatigue. Negative for chills and fever.   HENT: Positive for congestion, sinus pain and sinus pressure. Negative for ear pain, postnasal drip, sore throat and trouble swallowing.    Eyes: Negative for discharge.   Respiratory: Positive for cough. Negative for chest tightness, shortness of breath and wheezing.    Cardiovascular: Negative.    Skin: Negative.    Neurological: Positive for headaches. Negative for dizziness and light-headedness.   Psychiatric/Behavioral: Negative.        Physical Exam   BP: 141/91  Heart Rate: 93  Temp: 97.7  F (36.5  C)  Resp: 16  SpO2: 95 %      Physical Exam   Constitutional: He is  oriented to person, place, and time. He appears well-developed and well-nourished. No distress.   HENT:   Head: Normocephalic and atraumatic.   Right Ear: External ear normal.   Left Ear: External ear normal.   Nose: Left sinus exhibits maxillary sinus tenderness and frontal sinus tenderness.   Mouth/Throat: Oropharynx is clear and moist.   Cardiovascular: Normal rate and normal heart sounds.    Pulmonary/Chest: Effort normal. He has wheezes.   Neurological: He is alert and oriented to person, place, and time.   Skin: Skin is warm and dry.   Psychiatric: He has a normal mood and affect.   Nursing note and vitals reviewed.      ED Course     ED Course     Procedures         Assessments & Plan (with Medical Decision Making)     I have reviewed the nursing notes.  I have reviewed the findings, diagnosis, plan and need for follow up with the patient.    Discharge Medication List as of 1/1/2018  4:38 PM      START taking these medications    Details   predniSONE (DELTASONE) 20 MG tablet Take two tablets (= 40mg) each day for 5 (five) days, Disp-10 tablet, R-0, E-Prescribe      amoxicillin-clavulanate (AUGMENTIN) 875-125 MG per tablet Take 1 tablet by mouth 2 times daily for 7 days, Disp-14 tablet, R-0, Local Print      fluticasone (FLONASE) 50 MCG/ACT spray Spray 1 spray into both nostrils 2 times daily for 5 days, Disp-16 g, R-0, E-Prescribe      benzonatate (TESSALON) 200 MG capsule Take 1 capsule (200 mg) by mouth 3 times daily as needed for cough, Disp-21 capsule, R-0, E-Prescribe             Final diagnoses:   Rhinosinusitis   Acute bronchospasm   Pt is wheezy, so will start steroid. He is not interested in inhaler. Tessalon for cough and rest/fluids. Discussed this is still likely viral in nature, so will watch/wait with antibiotic and if poor improvement day 10 of illness, will start antibiotic vs f/u with PCP. He will seek attention sooner with any worsening or concerns. Patient verbally educated and given  appropriate education sheets for each of the diagnoses and has no questions.    Taran Lazar PA-C   1/2/2018   7:14 PM    1/1/2018   [facility]     Taran Lazar PA  01/02/18 1914

## 2018-01-02 ASSESSMENT — ENCOUNTER SYMPTOMS
CHILLS: 0
HEADACHES: 1
CARDIOVASCULAR NEGATIVE: 1
SORE THROAT: 0
CHEST TIGHTNESS: 0
PSYCHIATRIC NEGATIVE: 1
WHEEZING: 0
LIGHT-HEADEDNESS: 0
EYE DISCHARGE: 0
DIZZINESS: 0
COUGH: 1
FEVER: 0
TROUBLE SWALLOWING: 0
SINUS PRESSURE: 1
SINUS PAIN: 1
SHORTNESS OF BREATH: 0
FATIGUE: 1

## 2018-09-12 ENCOUNTER — HOSPITAL ENCOUNTER (EMERGENCY)
Facility: HOSPITAL | Age: 28
Discharge: HOME | End: 2018-09-12
Attending: NURSE PRACTITIONER | Admitting: NURSE PRACTITIONER
Payer: COMMERCIAL

## 2018-09-12 VITALS
SYSTOLIC BLOOD PRESSURE: 134 MMHG | RESPIRATION RATE: 16 BRPM | DIASTOLIC BLOOD PRESSURE: 83 MMHG | TEMPERATURE: 97.7 F | OXYGEN SATURATION: 99 %

## 2018-09-12 DIAGNOSIS — H69.93 DYSFUNCTION OF BOTH EUSTACHIAN TUBES: ICD-10-CM

## 2018-09-12 PROCEDURE — 99213 OFFICE O/P EST LOW 20 MIN: CPT | Performed by: NURSE PRACTITIONER

## 2018-09-12 PROCEDURE — G0463 HOSPITAL OUTPT CLINIC VISIT: HCPCS

## 2018-09-12 RX ORDER — PREDNISONE 20 MG/1
20 TABLET ORAL DAILY
Qty: 5 TABLET | Refills: 0 | Status: SHIPPED | OUTPATIENT
Start: 2018-09-12 | End: 2020-07-31

## 2018-09-12 ASSESSMENT — ENCOUNTER SYMPTOMS
SHORTNESS OF BREATH: 0
PALPITATIONS: 0
RHINORRHEA: 0
HEADACHES: 1
FATIGUE: 0
SORE THROAT: 0
DIZZINESS: 0
SINUS PRESSURE: 0
COUGH: 0
FEVER: 0
SINUS PAIN: 0
ABDOMINAL PAIN: 0

## 2018-09-12 NOTE — ED AVS SNAPSHOT
[facility]    [address] [place] [address]    Phone:  [phone]                                       Roberto Meza   MRN: [id number]    Department[facility]  [facility]   Date of Visit:  9/12/2018           Patient Information     Date Of Birth          1990        Your diagnoses for this visit were:     Dysfunction of both eustachian tubes        You were seen by Magdiel Nicholas NP.      Follow-up Information     Follow up with Dodie Hebert NP.    Contact information:    [facility] [place]  [address] [place] [address]  [phone]          Discharge Instructions         Common Middle Ear Problems  1. Flonase spray to each nostril 2 times a day for 5-7 days  2. Prednisone 20mg a day for 5 days      Your middle ear may have been injured or infected recently. Over time, certain growths or bone disease can also harm the middle ear. Left untreated, middle ear problems often lead to lifelong hearing loss. There are two types of hearing loss: conductive and sensorineural. One or both kinds can occur. Injury, infection, certain growths, or bone disease can cause your symptoms. A ruptured eardrum or a long-lasting (chronic) ear infection may be painful and decrease hearing.  Symptoms    Hearing loss in one or both ears    Fluid, often smelly, draining from the ear    Pain, pressure, or discomfort in the ear    Ringing in the ear  Conductive and sensorineural hearing loss  Sound waves may be disrupted before they reach the inner ear. If this happens, conductive hearing loss may occur. The ear canal can be blocked by wax, infection, a tumor, or a foreign object. The eardrum can be injured or infected. Abnormal bone growth, infection, or tumors in the middle ear can block sound waves.  Sound waves may not be processed correctly in the inner ear. If this happens, sensorineural hearing loss may occur. Permanent hearing loss is most commonly associated with sensorineural  problems.  The tests and evaluations used to diagnose what type of hearing problem you have will depend on your symptoms.   Date Last Reviewed: 10/1/2016    9662-7103 [employer]. [address] [place] [address], [place] [address]. All rights reserved. This information is not intended as a substitute for professional medical care. Always follow your healthcare professional's instructions.             Review of your medicines      START taking        Dose / Directions Last dose taken    fluticasone 27.5 MCG/SPRAY spray   Commonly known as:  VERAMYST   Dose:  1 spray   Quantity:  10 g        Spray 1 spray into both nostrils 2 times daily For 5-7 days.   Refills:  3        predniSONE 20 MG tablet   Commonly known as:  DELTASONE   Dose:  20 mg   Quantity:  5 tablet        Take 1 tablet (20 mg) by mouth daily   Refills:  0          Our records show that you are taking the medicines listed below. If these are incorrect, please call your family doctor or clinic.        Dose / Directions Last dose taken    ibuprofen 800 MG tablet   Commonly known as:  ADVIL/MOTRIN   Dose:  800 mg   Quantity:  90 tablet        Take 1 tablet (800 mg) by mouth 3 times daily   Refills:  1                Prescriptions were sent or printed at these locations (2 Prescriptions)                   [facility] [address] [address]    Telephone:  [phone]   Fax:  [phone]   Hours:                  E-Prescribed (2 of 2)         fluticasone (VERAMYST) 27.5 MCG/SPRAY spray               predniSONE (DELTASONE) 20 MG tablet                Orders Needing Specimen Collection     None      Pending Results     No orders found from 9/10/2018 to 9/13/2018.            Pending Culture Results     No orders found from 9/10/2018 to 9/13/2018.            Thank you for choosing [facility]       Thank you for choosing [facility] for your care. Our goal is always to provide you with  "excellent care. Hearing back from our patients is one way we can continue to improve our services. Please take a few minutes to complete the written survey that you may receive in the mail after you visit with us. Thank you!        [employer] Information     [employer] lets you send messages to your doctor, view your test results, renew your prescriptions, schedule appointments and more. To sign up, go to [email or web address] . Click on \"Log in\" on the left side of the screen, which will take you to the Welcome page. Then click on \"Sign up Now\" on the right side of the page.     You will be asked to enter the access code listed below, as well as some personal information. Please follow the directions to create your username and password.     Your access code is: [email or web address]  Expires: 2018 10:56 AM     Your access code will  in 90 days. If you need help or a new code, please call your [facility] clinic or [phone].        Care EveryWhere ID     This is your Care EveryWhere ID. This could be used by other organizations to access your [facility] medical records  [id number]        Equal Access to Services     [facility] Hospital[facility] : Hadii hector Garrett, betzy chowdhury, danni matsonalsundeep walsh, heriberto nicole . So [facility] [phone].    ATENCIÓN: Si habla español, tiene a cannon disposición servicios gratuitos de asistencia lingüística. Llame al [phone].    We comply with applicable federal civil rights laws and [place] laws. We do not discriminate on the basis of race, color, national origin, age, disability, sex, sexual orientation, or gender identity.            After Visit Summary       This is your record. Keep this with you and show to your community pharmacist(s) and doctor(s) at your next visit.                  "

## 2018-09-12 NOTE — ED AVS SNAPSHOT
[facility]    [address] [place] [address]    Phone:  [phone]                                       Roberto Meza   MRN: [id number]    Department[facility]   Date of Visit:  9/12/2018           After Visit Summary Signature Page     I have received my discharge instructions, and my questions have been answered. I have discussed any challenges I see with this plan with the nurse or doctor.    ..........................................................................................................................................  Patient/Patient Representative Signature      ..........................................................................................................................................  Patient Representative Print Name and Relationship to Patient    ..................................................               ................................................  Date                                   Time    ..........................................................................................................................................  Reviewed by Signature/Title    ...................................................              ..............................................  Date                                               Time          22EPIC Rev 08/18         Pt informed of normal bone density results. Also requested to have Diflucan sent to her pharmacy [facility]. Stated she had developed a yeast infection after taking an antibiotic.

## 2018-09-12 NOTE — DISCHARGE INSTRUCTIONS
Common Middle Ear Problems  1. Flonase spray to each nostril 2 times a day for 5-7 days  2. Prednisone 20mg a day for 5 days      Your middle ear may have been injured or infected recently. Over time, certain growths or bone disease can also harm the middle ear. Left untreated, middle ear problems often lead to lifelong hearing loss. There are two types of hearing loss: conductive and sensorineural. One or both kinds can occur. Injury, infection, certain growths, or bone disease can cause your symptoms. A ruptured eardrum or a long-lasting (chronic) ear infection may be painful and decrease hearing.  Symptoms    Hearing loss in one or both ears    Fluid, often smelly, draining from the ear    Pain, pressure, or discomfort in the ear    Ringing in the ear  Conductive and sensorineural hearing loss  Sound waves may be disrupted before they reach the inner ear. If this happens, conductive hearing loss may occur. The ear canal can be blocked by wax, infection, a tumor, or a foreign object. The eardrum can be injured or infected. Abnormal bone growth, infection, or tumors in the middle ear can block sound waves.  Sound waves may not be processed correctly in the inner ear. If this happens, sensorineural hearing loss may occur. Permanent hearing loss is most commonly associated with sensorineural problems.  The tests and evaluations used to diagnose what type of hearing problem you have will depend on your symptoms.   Date Last Reviewed: 10/1/2016    8096-8950 [employer]. [address] [place] [address], [place] [address]. All rights reserved. This information is not intended as a substitute for professional medical care. Always follow your healthcare professional's instructions.

## 2018-09-12 NOTE — ED PROVIDER NOTES
History     Chief Complaint   Patient presents with     Ear Fullness     x 1 week     HPI  Roberto Meza is a 28 year old male who Has had a feeling of ear fullness for 1 week. Has nno fever, constant HA's,  Pressue fulllness to ears,  No runny nose or postnasal drip. No tooth pain  No sore throat.  No Nausea , Vomiting , Diarrhea.      Problem List:    Patient Active Problem List    Diagnosis Date Noted     Cat bite 11/07/2013     Priority: Medium        Past Medical History:    History reviewed. No pertinent past medical history.    Past Surgical History:    Past Surgical History:   Procedure Laterality Date     growth removal[  2001    left leg       Family History:    No family history on file.    Social History:  Marital Status:  Single [1]  Social History   Substance Use Topics     Smoking status: Current Some Day Smoker     Packs/day: 0.25     Types: Pipe     Smokeless tobacco: Not on file     Alcohol use Yes      Comment: drinks once a week        Medications:      fluticasone (VERAMYST) 27.5 MCG/SPRAY spray   predniSONE (DELTASONE) 20 MG tablet   ibuprofen (ADVIL/MOTRIN) 800 MG tablet         Review of Systems   Constitutional: Negative for fatigue and fever.   HENT: Positive for ear pain. Negative for congestion, ear discharge, hearing loss, postnasal drip, rhinorrhea, sinus pain, sinus pressure, sore throat and tinnitus.         Pressure, fullness both ears.     Respiratory: Negative for cough and shortness of breath.    Cardiovascular: Negative for chest pain and palpitations.   Gastrointestinal: Negative for abdominal pain.   Neurological: Positive for headaches. Negative for dizziness.       Physical Exam   BP: 134/83  Heart Rate: 75  Temp: 97.7  F (36.5  C)  Resp: 16  SpO2: 99 %      Physical Exam   Constitutional: He is oriented to person, place, and time. He appears well-developed and well-nourished.   HENT:   Right Ear: External ear normal.   Left Ear: External ear normal.   Nose: Nose normal.    Mouth/Throat: Oropharynx is clear and moist.   TM's are translucent, with lite reflex.     Eyes: Conjunctivae and EOM are normal. Pupils are equal, round, and reactive to light.   Neck: Normal range of motion. Neck supple. No thyromegaly present.   Cardiovascular: Normal rate, regular rhythm and normal heart sounds.    No murmur heard.  Pulmonary/Chest: Effort normal and breath sounds normal.   Musculoskeletal: Normal range of motion.   Lymphadenopathy:     He has no cervical adenopathy.   Neurological: He is alert and oriented to person, place, and time.   Skin: Skin is warm and dry.   Psychiatric: He has a normal mood and affect.       ED Course     ED Course     Procedures             No results found for this or any previous visit (from the past 24 hour(s)).    Medications - No data to display    Assessments & Plan (with Medical Decision Making)     I have reviewed the nursing notes.    I have reviewed the findings, diagnosis, plan and need for follow up with the patient.      Discharge Medication List as of 9/12/2018 10:56 AM      START taking these medications    Details   fluticasone (VERAMYST) 27.5 MCG/SPRAY spray Spray 1 spray into both nostrils 2 times daily For 5-7 days., Disp-10 g, R-3, E-Prescribe      predniSONE (DELTASONE) 20 MG tablet Take 1 tablet (20 mg) by mouth daily, Disp-5 tablet, R-0, E-Prescribe             Final diagnoses:   Dysfunction of both eustachian tubes     ASSESSMENT / PLAN:  (H69.83) Dysfunction of both eustachian tubes  Comment: Explained no signs of infection. Eustachian tubes may be swollen.    Plan:   1. Prednisone 20mg a day for 5 days  2. Flonase spray to each nostril 2 times a day for 5-7 days.    3. Keep well hydrated.    4. Follow up with Primary doctor if continued problem for referral to ENT      9/12/2018   [facility]     Magdiel Nicholas NP  09/12/18 3511

## 2019-09-02 NOTE — ED AVS SNAPSHOT
[facility]    [address] [address]    Phone:  [phone]                                       Roberto Meza   MRN: [id number]    Department:  [facility]   Date of Visit:  4/11/2017           Patient Information     Date Of Birth          1990        Your diagnoses for this visit were:     Strain of lumbar region, initial encounter     Muscle spasm        You were seen by Kat Juan NP.      Follow-up Information     Follow up with [facility].    Specialty:  EMERGENCY MEDICINE    Why:  As needed, If symptoms worsen    Contact information:    [address] [address] [phone]    Additional information:    From [facility]: Take [address]. Turn left at [address]. Turn left at the first stoplight on [address]. At the first stop sign, take a right onto [address]. Take a left into the parking lot and continue through until you reach the North enterance of the building.       From [place]: Take [address]. Take the [address] ramp towards [place]. Turn left onto [address]. Take a slight right onto [address]. Turn left at the first stoplight on [address]. At the first stop sign, take a right onto [address]. Take a left into the parking lot and continue through until you reach the North enterance of the building.       From [place]: Take [address]. Take a right at [address]. At the first stop sign, take a right onto [address]. Take a left into the parking lot and continue through until you reach the North enterance of the building.         Discharge Instructions       Take Toradol as ordered for pain. NO additional NSAIDS (ibuprfen, aspirin, naprosyn, etc..)  when taking.   Take Flexeril as directed for muscle spasm. Do not drive or participate in activities that require alertness when taking.   Apply ice to lower  back for 20 minutes every 1-2 hours. Protect skin.   Work note.   Follow up with PCP in 10 days.   Return to urgent care or emergency department with any increase in symptoms or concerns.     Discharge References/Attachments     BACK SPRAIN/STRAIN (ENGLISH)    BACK SPASM, NO TRAUMA (ENGLISH)    STRAINS AND SPRAINS, SELF-CARE FOR (ENGLISH)    STRAINS AND SPRAINS, TREATING (ENGLISH)         Review of your medicines      START taking        Dose / Directions Last dose taken    cyclobenzaprine 10 MG tablet   Commonly known as:  FLEXERIL   Dose:  10 mg   Quantity:  15 tablet        Take 1 tablet (10 mg) by mouth 3 times daily as needed for muscle spasms   Refills:  0        ketorolac 10 MG tablet   Commonly known as:  TORADOL   Dose:  10 mg   Quantity:  20 tablet        Take 1 tablet (10 mg) by mouth every 6 hours as needed for moderate pain   Refills:  0                Prescriptions were sent or printed at these locations (2 Prescriptions)                   [facility] [address] [place] [address], [place] - [address] AT [facility] [address]   [address], [place] [address]    Telephone:  [phone]   Fax:  [phone]   Hours:                  E-Prescribed (2 of 2)         cyclobenzaprine (FLEXERIL) 10 MG tablet               ketorolac (TORADOL) 10 MG tablet                Procedures and tests performed during your visit     Lumbar spine XR, 2-3 views      Orders Needing Specimen Collection     None      Pending Results     No orders found from 4/9/2017 to 4/12/2017.            Pending Culture Results     No orders found from 4/9/2017 to 4/12/2017.            Thank you for choosing [facility]       Thank you for choosing [facility] for your care. Our goal is always to provide you with excellent care. Hearing back from our patients is one way we can continue to improve our services. Please take a few minutes to complete the written survey that you may receive in the mail after you visit with us. Thank you!       "  MyChart Information     [employer] lets you send messages to your doctor, view your test results, renew your prescriptions, schedule appointments and more. To sign up, go to [email or web address] . Click on \"Log in\" on the left side of the screen, which will take you to the Welcome page. Then click on \"Sign up Now\" on the right side of the page.     You will be asked to enter the access code listed below, as well as some personal information. Please follow the directions to create your username and password.     Your access code is: [email or web address]  Expires: 7/10/2017  1:01 PM     Your access code will  in 90 days. If you need help or a new code, please call your [facility] clinic or [phone].        Care EveryWhere ID     This is your Care EveryWhere ID. This could be used by other organizations to access your [facility] medical records  [id number]        After Visit Summary       This is your record. Keep this with you and show to your community pharmacist(s) and doctor(s) at your next visit.                  " Capillary refill less/equal to 2 seconds/Strong peripheral pulses

## 2020-01-13 ENCOUNTER — TRANSFERRED RECORDS (OUTPATIENT)
Dept: HEALTH INFORMATION MANAGEMENT | Facility: CLINIC | Age: 30
End: 2020-01-13

## 2020-01-13 LAB
CREAT SERPL-MCNC: 1.18 MG/DL (ref 0.7–1.2)
GFR SERPL CREATININE-BSD FRML MDRD: >60 ML/MIN/1.73M2
GLUCOSE SERPL-MCNC: 99 MG/DL (ref 70–99)
POTASSIUM SERPL-SCNC: 5 MEQ/L (ref 3.4–5.1)

## 2020-02-25 ENCOUNTER — APPOINTMENT (OUTPATIENT)
Dept: OCCUPATIONAL MEDICINE | Facility: OTHER | Age: 30
End: 2020-02-25

## 2020-02-25 ENCOUNTER — APPOINTMENT (OUTPATIENT)
Dept: CHIROPRACTIC MEDICINE | Facility: OTHER | Age: 30
End: 2020-02-25

## 2020-02-25 PROCEDURE — 99499 UNLISTED E&M SERVICE: CPT

## 2020-07-31 ENCOUNTER — HOSPITAL ENCOUNTER (EMERGENCY)
Facility: HOSPITAL | Age: 30
Discharge: HOME | End: 2020-07-31
Attending: NURSE PRACTITIONER | Admitting: NURSE PRACTITIONER
Payer: OTHER MISCELLANEOUS

## 2020-07-31 ENCOUNTER — TELEPHONE (OUTPATIENT)
Dept: FAMILY MEDICINE | Facility: OTHER | Age: 30
End: 2020-07-31

## 2020-07-31 VITALS
DIASTOLIC BLOOD PRESSURE: 92 MMHG | RESPIRATION RATE: 16 BRPM | SYSTOLIC BLOOD PRESSURE: 128 MMHG | OXYGEN SATURATION: 98 % | TEMPERATURE: 98.6 F

## 2020-07-31 DIAGNOSIS — H57.89 IRRITATION OF RIGHT EYE: ICD-10-CM

## 2020-07-31 DIAGNOSIS — H53.141 PHOTOPHOBIA OF RIGHT EYE: ICD-10-CM

## 2020-07-31 PROCEDURE — 99213 OFFICE O/P EST LOW 20 MIN: CPT | Mod: Z6 | Performed by: NURSE PRACTITIONER

## 2020-07-31 PROCEDURE — 25000125 ZZHC RX 250: Performed by: NURSE PRACTITIONER

## 2020-07-31 PROCEDURE — G0463 HOSPITAL OUTPT CLINIC VISIT: HCPCS

## 2020-07-31 RX ORDER — TETRACAINE HYDROCHLORIDE 5 MG/ML
1-2 SOLUTION OPHTHALMIC ONCE
Status: COMPLETED | OUTPATIENT
Start: 2020-07-31 | End: 2020-07-31

## 2020-07-31 RX ADMIN — TETRACAINE HYDROCHLORIDE 2 DROP: 5 SOLUTION OPHTHALMIC at 11:27

## 2020-07-31 ASSESSMENT — ENCOUNTER SYMPTOMS
CONSTITUTIONAL NEGATIVE: 1
ENDOCRINE NEGATIVE: 1
PSYCHIATRIC NEGATIVE: 1
CARDIOVASCULAR NEGATIVE: 1
PHOTOPHOBIA: 1
HEMATOLOGIC/LYMPHATIC NEGATIVE: 1
NEUROLOGICAL NEGATIVE: 1
EYE REDNESS: 1
RESPIRATORY NEGATIVE: 1
ALLERGIC/IMMUNOLOGIC NEGATIVE: 1
GASTROINTESTINAL NEGATIVE: 1
EYE DISCHARGE: 0
EYE PAIN: 1
MUSCULOSKELETAL NEGATIVE: 1

## 2020-07-31 ASSESSMENT — VISUAL ACUITY: OU: 1

## 2020-07-31 NOTE — TELEPHONE ENCOUNTER
Dodie is not in office until 8-4. Patient needs to be seen in ER/UC or at eye doctor as he should not wait for this . Please call and let patient know. Thanks.

## 2020-07-31 NOTE — TELEPHONE ENCOUNTER
9:02 AM    Reason for Call: OVERBOOK    Patient is having the following symptoms: severe eye pain for 2 days.    The patient is requesting an appointment for overbook request, has Establish care visit scheduled with yariel lópez .    Was an appointment offered for this call? Yes  If yes : Appointment type              Date    Preferred method for responding to this message: Telephone Call  What is your phone number ? [phone]    If we cannot reach you directly, may we leave a detailed response at the number you provided? Yes    Can this message wait until your PCP/provider returns, if unavailable today? YES,     Marcelo Armstrong

## 2020-07-31 NOTE — ED AVS SNAPSHOT
[facility]  [address] [place] [address]  Phone:  [phone]                                    Roberto Meza   MRN: [id number]    Department:  [facility]   Date of Visit:  7/31/2020           After Visit Summary Signature Page    I have received my discharge instructions, and my questions have been answered. I have discussed any challenges I see with this plan with the nurse or doctor.    ..........................................................................................................................................  Patient/Patient Representative Signature      ..........................................................................................................................................  Patient Representative Print Name and Relationship to Patient    ..................................................               ................................................  Date                                   Time    ..........................................................................................................................................  Reviewed by Signature/Title    ...................................................              ..............................................  Date                                               Time          22EPIC Rev 08/18

## 2020-07-31 NOTE — ED PROVIDER NOTES
History     Chief Complaint   Patient presents with     Eye Problem     right sided after getting metal in eye on Saturday. states he was given erythromycin to use in eye and has stopped using it and is now having more pain.      The history is provided by the patient.     Roberto Meza is a 29 year old male who presents to the  for recurrent rigth eye problem. He did have something removed from his eye last Saturday and he was started on eye ointment. For a couple days. He was told only to use for a day..  He has not follow up with ophthalmology    He is having increased eye pain since Wednesday, denies change in vision, is having some headaches      Allergies:  Allergies   Allergen Reactions     Bees        Problem List:    Patient Active Problem List    Diagnosis Date Noted     Cat bite 11/07/2013     Priority: Medium        Past Medical History:    No past medical history on file.    Past Surgical History:    Past Surgical History:   Procedure Laterality Date     growth removal[  2001    left leg       Family History:    No family history on file.    Social History:  Marital Status:  Single [1]  Social History     Tobacco Use     Smoking status: Current Some Day Smoker     Packs/day: 0.25     Types: Pipe   Substance Use Topics     Alcohol use: Yes     Comment: drinks once a week     Drug use: No        Medications:    fluticasone (VERAMYST) 27.5 MCG/SPRAY spray  ibuprofen (ADVIL/MOTRIN) 800 MG tablet          Review of Systems   Constitutional: Negative.    HENT: Negative.    Eyes: Positive for photophobia, pain and redness. Negative for discharge and visual disturbance.   Respiratory: Negative.    Cardiovascular: Negative.    Gastrointestinal: Negative.    Endocrine: Negative.    Genitourinary: Negative.    Musculoskeletal: Negative.    Skin: Negative.    Allergic/Immunologic: Negative.    Neurological: Negative.    Hematological: Negative.    Psychiatric/Behavioral: Negative.        Physical Exam   BP:  128/92  Heart Rate: 85  Temp: 98.6  F (37  C)  Resp: 16  SpO2: 98 %      Physical Exam  Vitals signs and nursing note reviewed.   Constitutional:       Appearance: Normal appearance.   Eyes:      General: Lids are normal. Vision grossly intact.      Conjunctiva/sclera:      Right eye: Right conjunctiva is injected.      Pupils: Pupils are equal, round, and reactive to light.      Right eye: No corneal abrasion or fluorescein uptake.      Slit lamp exam:     Right eye: Photophobia present. No corneal ulcer or foreign body.      Comments: Right eye redness and no uptake of dye   Cardiovascular:      Rate and Rhythm: Normal rate.   Pulmonary:      Effort: Pulmonary effort is normal. No respiratory distress.   Neurological:      Mental Status: He is alert.         ED Course        Procedures               Critical Care time:  none               No results found for this or any previous visit (from the past 24 hour(s)).    Medications   tetracaine (PONTOCAINE) 0.5 % ophthalmic solution 1-2 drop (2 drops Right Eye Given 7/31/20 1127)       Assessments & Plan (with Medical Decision Making)     I have reviewed the nursing notes.    I have reviewed the findings, diagnosis, plan and need for follow up with the patient.      Patient verbally educated and given appropriate education sheets for the diagnoses and has no questions.  Take medications as directed.   Follow up with your Primary Care provider if symptoms increase or if further concerns develop return to the ER    Plan to have patient follow up with ophthalmology today   Continues to have soreness/pain with occasional sharp stabbing pain into right eye, redness into right eye, photophobic, denies any change in vision   No foreign body found in right eye.  tetracaine used to numb the eye for exam    Appointment set up with [facility] for further eye exam today at ! PM    New Prescriptions    No medications on file       Final diagnoses:   Photophobia of right eye    Irritation of right eye       7/31/2020   [facility]     [facility], GEORGETTE CNP  07/31/20 114

## 2020-07-31 NOTE — ED TRIAGE NOTES
Pt presents with a reddened and sore right eye since last Saturday when he had a small black spec removed in [place]. Took Erythromycin ointment at 0800 this morning.

## 2020-08-31 PROBLEM — Z76.89 ENCOUNTER TO ESTABLISH CARE: Status: ACTIVE | Noted: 2020-08-31

## 2020-08-31 NOTE — PROGRESS NOTES
"Subjective     Roberto Meza is a 30 year old male who presents to clinic today for the following health issues:    HPI       New Patient/Transfer of Care  At this time, past medical history, current medications, allergies and drug sensitivities, immunizations, habits and life style, family history, and social history are reviewed and updated. Due for HIV screening. Willing to get.     He does smoke. Has gotten the pneumococcal vaccine. No interest in quitting.     Patient complains of left shoulder times 4 years. He notes that the pain has been managed with ice and stretches, but it has recently worsened. No known injury, but he is a  and often changes mining tires. He notes that now he is having trouble sleeping due to the pain. Does not click or pop. No erythema. Occasionally swelling. Occasionally takes 800 mg of iburpifen and notes that it helps. No weakness. No numbness or tingling.      No fevers. No chest pain or shortness of breath.       Review of Systems   As noted in the HPI.       Objective    /70 (BP Location: Left arm, Patient Position: Chair, Cuff Size: Adult Large)   Pulse 76   Temp 97.4  F (36.3  C) (Tympanic)   Ht 1.892 m (6' 2.5\")   Wt 107.5 kg (237 lb)   SpO2 95%   BMI 30.02 kg/m    Body mass index is 30.02 kg/m .  Physical Exam   GENERAL: healthy, alert and no distress  EYES: Eyes grossly normal to inspection, PERRL and conjunctivae and sclerae normal  HENT: ear canals and TM's normal, nose and mouth without ulcers or lesions  NECK: no adenopathy, no asymmetry, masses, or scars and thyroid normal to palpation  RESP: lungs clear to auscultation - no rales, rhonchi or wheezes  CV: regular rate and rhythm, normal S1 S2, no S3 or S4, no murmur, click or rub, no peripheral edema and peripheral pulses strong  NEURO: Normal strength and tone, mentation intact and speech normal  PSYCH: mentation appears normal, affect normal/bright  LEFT SHOULDER: Skin intact. No erythema, edema, " "or ecchymosis. No pain with palpation. Full ROM, but he does have pain with internal and external rotation, adduction and abduction, and flexion and extension.     PROCEDURE:  XR SHOULDER LT G/E 3 VW     HISTORY: Chronic left shoulder pain; Chronic left shoulder pain     COMPARISON:  None.     TECHNIQUE:  4 views of the left shoulder were obtained.     FINDINGS:  No fracture or dislocation is identified. The joint spaces  are preserved.                                                                        IMPRESSION: Normal left shoulder          Assessment & Plan     Encounter to establish care  Patient is in need of a new provider. he has been explained the role of a CNP and the fact that I do not follow patients in the hospital. he was told that should he get admitted, he would then be followed by a hospitalist. he verbalizes understanding and would like to establish a relationship now.     Tobacco abuse  Cessation encouraged.     Chronic left shoulder pain  Patient with worsening left shoulder pain. XR normal. Will see if he wants to try PT. If PT does not help, I would recommend he get an MRI.     Screening for HIV (human immunodeficiency virus)  - HIV Antigen Antibody Combo  -Will notify patient of the results when available and intervene accordingly.      Tobacco Cessation:   reports that he has been smoking pipe and cigarettes. He has been smoking about 0.25 packs per day. He has never used smokeless tobacco.  Tobacco Cessation Action Plan: Information offered: Patient not interested at this time      BMI:   Estimated body mass index is 30.02 kg/m  as calculated from the following:    Height as of this encounter: 1.892 m (6' 2.5\").    Weight as of this encounter: 107.5 kg (237 lb).   Weight management plan: Discussed healthy diet and exercise guidelines    No follow-ups on file.    Dodie Hebert NP  [facility] [place]    "

## 2020-09-03 ENCOUNTER — OFFICE VISIT (OUTPATIENT)
Dept: FAMILY MEDICINE | Facility: OTHER | Age: 30
End: 2020-09-03
Attending: NURSE PRACTITIONER
Payer: COMMERCIAL

## 2020-09-03 ENCOUNTER — ANCILLARY PROCEDURE (OUTPATIENT)
Dept: GENERAL RADIOLOGY | Facility: OTHER | Age: 30
End: 2020-09-03
Attending: NURSE PRACTITIONER
Payer: COMMERCIAL

## 2020-09-03 VITALS
OXYGEN SATURATION: 95 % | HEIGHT: 75 IN | TEMPERATURE: 97.4 F | HEART RATE: 76 BPM | DIASTOLIC BLOOD PRESSURE: 70 MMHG | BODY MASS INDEX: 29.47 KG/M2 | SYSTOLIC BLOOD PRESSURE: 118 MMHG | WEIGHT: 237 LBS

## 2020-09-03 DIAGNOSIS — Z76.89 ENCOUNTER TO ESTABLISH CARE: ICD-10-CM

## 2020-09-03 DIAGNOSIS — G89.29 CHRONIC LEFT SHOULDER PAIN: ICD-10-CM

## 2020-09-03 DIAGNOSIS — Z72.0 TOBACCO ABUSE: Primary | ICD-10-CM

## 2020-09-03 DIAGNOSIS — Z11.4 SCREENING FOR HIV (HUMAN IMMUNODEFICIENCY VIRUS): ICD-10-CM

## 2020-09-03 DIAGNOSIS — M25.512 CHRONIC LEFT SHOULDER PAIN: Primary | ICD-10-CM

## 2020-09-03 DIAGNOSIS — G89.29 CHRONIC LEFT SHOULDER PAIN: Primary | ICD-10-CM

## 2020-09-03 DIAGNOSIS — M25.512 CHRONIC LEFT SHOULDER PAIN: ICD-10-CM

## 2020-09-03 PROBLEM — K21.9 GASTRIC REFLUX: Status: ACTIVE | Noted: 2018-04-24

## 2020-09-03 PROBLEM — F17.290 CIGAR SMOKER: Status: ACTIVE | Noted: 2018-04-24

## 2020-09-03 PROCEDURE — 87389 HIV-1 AG W/HIV-1&-2 AB AG IA: CPT | Performed by: NURSE PRACTITIONER

## 2020-09-03 PROCEDURE — 73030 X-RAY EXAM OF SHOULDER: CPT | Mod: TC

## 2020-09-03 PROCEDURE — 99213 OFFICE O/P EST LOW 20 MIN: CPT | Performed by: NURSE PRACTITIONER

## 2020-09-03 PROCEDURE — 36415 COLL VENOUS BLD VENIPUNCTURE: CPT | Performed by: NURSE PRACTITIONER

## 2020-09-03 RX ORDER — IBUPROFEN 200 MG
200 TABLET ORAL EVERY 4 HOURS PRN
COMMUNITY
End: 2021-02-03

## 2020-09-03 ASSESSMENT — ANXIETY QUESTIONNAIRES
IF YOU CHECKED OFF ANY PROBLEMS ON THIS QUESTIONNAIRE, HOW DIFFICULT HAVE THESE PROBLEMS MADE IT FOR YOU TO DO YOUR WORK, TAKE CARE OF THINGS AT HOME, OR GET ALONG WITH OTHER PEOPLE: NOT DIFFICULT AT ALL
6. BECOMING EASILY ANNOYED OR IRRITABLE: SEVERAL DAYS
3. WORRYING TOO MUCH ABOUT DIFFERENT THINGS: NOT AT ALL
GAD7 TOTAL SCORE: 2
7. FEELING AFRAID AS IF SOMETHING AWFUL MIGHT HAPPEN: NOT AT ALL
2. NOT BEING ABLE TO STOP OR CONTROL WORRYING: NOT AT ALL
5. BEING SO RESTLESS THAT IT IS HARD TO SIT STILL: SEVERAL DAYS
1. FEELING NERVOUS, ANXIOUS, OR ON EDGE: NOT AT ALL

## 2020-09-03 ASSESSMENT — PATIENT HEALTH QUESTIONNAIRE - PHQ9
5. POOR APPETITE OR OVEREATING: NOT AT ALL
SUM OF ALL RESPONSES TO PHQ QUESTIONS 1-9: 3

## 2020-09-03 ASSESSMENT — MIFFLIN-ST. JEOR: SCORE: 2107.71

## 2020-09-03 ASSESSMENT — PAIN SCALES - GENERAL: PAINLEVEL: MODERATE PAIN (4)

## 2020-09-03 NOTE — NURSING NOTE
"Chief Complaint   Patient presents with     Establish Care       Initial /70 (BP Location: Left arm, Patient Position: Chair, Cuff Size: Adult Large)   Pulse 76   Temp 97.4  F (36.3  C) (Tympanic)   Ht 1.892 m (6' 2.5\")   Wt 107.5 kg (237 lb)   SpO2 95%   BMI 30.02 kg/m   Estimated body mass index is 30.02 kg/m  as calculated from the following:    Height as of this encounter: 1.892 m (6' 2.5\").    Weight as of this encounter: 107.5 kg (237 lb).  Medication Reconciliation: complete  Naa Moore LPN  "

## 2020-09-04 LAB — HIV 1+2 AB+HIV1 P24 AG SERPL QL IA: NONREACTIVE

## 2020-09-04 ASSESSMENT — ANXIETY QUESTIONNAIRES: GAD7 TOTAL SCORE: 2

## 2020-09-09 ENCOUNTER — HOSPITAL ENCOUNTER (OUTPATIENT)
Dept: PHYSICAL THERAPY | Facility: HOSPITAL | Age: 30
Setting detail: THERAPIES SERIES
End: 2020-09-09
Attending: NURSE PRACTITIONER
Payer: COMMERCIAL

## 2020-09-09 ENCOUNTER — TELEPHONE (OUTPATIENT)
Dept: FAMILY MEDICINE | Facility: OTHER | Age: 30
End: 2020-09-09

## 2020-09-09 DIAGNOSIS — G89.29 CHRONIC LEFT SHOULDER PAIN: ICD-10-CM

## 2020-09-09 DIAGNOSIS — M25.512 CHRONIC LEFT SHOULDER PAIN: Primary | ICD-10-CM

## 2020-09-09 DIAGNOSIS — M25.512 CHRONIC LEFT SHOULDER PAIN: ICD-10-CM

## 2020-09-09 DIAGNOSIS — G89.29 CHRONIC LEFT SHOULDER PAIN: Primary | ICD-10-CM

## 2020-09-09 PROCEDURE — 97161 PT EVAL LOW COMPLEX 20 MIN: CPT | Mod: GP

## 2020-09-09 PROCEDURE — 97110 THERAPEUTIC EXERCISES: CPT | Mod: GP

## 2020-09-09 NOTE — PROGRESS NOTES
Initial Physical Therapy Evaluation      Name: Roberto Meza MRN# [id number]   Age: 30 year old YOB: 1990     Date of Consultation: September 9, 2020  Primary care provider: Dodie Hebert    Referring Physician: Dodie Hebert NP  Orders: Eval and Treat  Medical Diagnosis: Chronic L shoulder pain  Onset of Illness/Injury: Las several years     Reason for PT Visit: Patient is a 29 y/o male who presents with chronic L shoulder pain. Reports he is a  and has strained his shoulder several times. In the last couple of months he has been noticing decreased strength during lifting and pushing. Reports pain sometimes radiates down to elbow and chest. Is not able to sleep on L side. He is L handed  Prior Level of Function: Independent   Pain: 1/10  Aching, Sharp and Stabbing    Community Support/Living Environment/Employment History: Works as a       Patient/Family Goal: Less pain, get stretches and exercises to try     he last year? No  Have you fallen and had an injury in the last year? No  Timed up & go: NA  Is patient a fall risk? No    Past Medical History:   No past medical history on file.    Past Surgical History:  Past Surgical History:   Procedure Laterality Date     growth removal[  2001    left leg     HERNIA REPAIR  07/2019    [facility]       Medications:   Current Outpatient Medications   Medication Sig     fluticasone (VERAMYST) 27.5 MCG/SPRAY spray Spray 1 spray into both nostrils 2 times daily For 5-7 days. (Patient taking differently: Spray 1 spray into both nostrils 2 times daily For 5-7 days as needed)     ibuprofen (ADVIL/MOTRIN) 200 MG tablet Take 200 mg by mouth every 4 hours as needed for mild pain     No current facility-administered medications for this encounter.        Imaging: x-ray unremarkable     Musculoskeletal Findings:     OBJECTIVE   Observation: Patient appears to PT in to acute distress  Palpation: Mildy painful to RTC  palpation  Functional mobility   Posture: forward head, protracted shoulders  Sitting Posture: poor   Standing Posture: Fair     Neurological Assessment:      Myotomes:  Right upper extremity: Intact  Left upper extremity: Intact    Dermatomes:   Right upper extremity: Intact  Left upper extremity: Intact    Range of Motion/Strength:   Cervical range of motion: within normal limits    Shoulder Range of Motion and Strength    RIGHT Shoulder ROM (Active)  Flexion: WFL  Abduction: WFL  Internal Rotation: WFL  Functional IR: WFL  External Rotation: WFL  Functional ER: WFL     LEFT Shoulder ROM (Active)  Flexion: 150 degrees, painful   Abduction: 120 degrees, significant compensation for RTC weakness, very painful   Internal Rotation: Full   Functional IR: Full   External Rotation: 75% impaired and painful   Functional ER: Impaired   Bilateral elbow, wrist, and hand range of motion and strength within normal limits.  Lower and middle trapezius strength 4/5 bilaterally.    STRENGTH                                                 Left                       Flexion:                              3+/5                            Extension:                           5/5                         IR:                                      5/5                         ER:                                     2+/5                         Abduction:                          2+/5                 Special Tests:  Cervical Spine Tests  Spurling: NA    Shoulder Special Tests:  Neer s: + for pain  Olivia s: NA  Drop-Arm: Difficult to complete due to pain   Speeds: Negative   Yergason: Negative  External Rotation Lag Sign: Difficult to obtain testing position      Functional Outcomes:     SPADI:   13.85%    Short-term goals:  To be achieved in 2-3 weeks:  Instruct in home program    1.) Patient will understand biomechanical stressors of the shoulder joint in order to make modifications to activities to reduce further risk of injury.  2.) Patient  will be independent with a short-term home exercise program.  3.) Patient will report 25% improvement of overall symptoms to allow decreased shoulder pain with daily movement and activity.       Long-term goals:  To be achieved in 6 weeks:  Self management of symptoms  Pain free activities of daily living  Return to previous level of function    1.) Improve score on Shoulder Pain and Disability Index by 13 points to correlate with clinically significant change.  2.) Patient will report 50% improvement of overall symptoms to allow decreased shoulder pain with daily movement and activity  3.) Pt will sleep on L side without being limited by pain     Discharge goals: Patient will be independent with a home program for self-management of symptoms.      Outcome Measures:       Prognosis/Plan of Care:   Appropriate for Physical Therapy Intervention: Yes       Planned Interventions: Therapeutic exercise, manual therapy, therapeutic activity, patient education, US if needed     Patient presents today with signs and symptoms consistent of L RTC injury with chronic pain. Therapy today consisted of evaluation, patient education, and therapeutic exercise. Patient would benefit from continued PT sessions addressing overall pain and dysfunction with use of appropriate interventions. If symptoms do not improved would recommend MRI to rule out RTC tear    Clinical Impressions:  Criteria for Skilled Therapeutic Intervention Met: Yes  PT Diagnosis: RTC weakness, pain  Influenced by the following impairments: Decreased ROM, weakness, impaired posture  Functional limitations due to impairment: Decreased ability to complete work tasks, daily activities without being limited by pain  Clinical presentation: Stable/Uncomplicated  Clinical presentation rationale: Clinical judgement  Clinical Decision making (complexity): Low Complexity  Predicted Duration of Therapy Intervention (days/wks): up to 90 days  Risks and Benefits of therapy have  been explained: Yes  Patient, Family & other staff in agreement with plan of care: Yes  Comments:   Frequency: 1-2 times/week  Date Range: 9/9/20 to 12/8/20    Treatment Rendered/Intervention:  Evaluation completed as described above followed by discussion of exam findings and plan of care.    Therapeutic exercise: Patient instructed in and demonstrated proper performance of home exercise program consisting of:  Gentle ROM, ER strengthening      Educated in posture principles and neutral spine positioning. Patient was instructed in home use of heat and/or ice for pain management      Total Evaluation Time:20    I certify the need for these services furnished under this plan of treatment and while under my care. (Physician co-signature of this document indicates review and certification of the therapy plan).

## 2020-09-09 NOTE — TELEPHONE ENCOUNTER
----- Message from Dodie Hebert NP sent at 9/9/2020 10:56 AM CDT -----  Regarding: FW: L shoulder  Can you let pt know that we can proceed with MRI? If willing, please pend MRI left shoulder without contrast.   ----- Message -----  From: Carole Beckett, PT  Sent: 9/9/2020  10:26 AM CDT  To: Dodie Hebert NP  Subject: L shoulder                                       Hi Dodie, I evaluated Roberto's shoulder today. I see you were thinking of doing an MRI and I would agree that it would be helpful to assess for RTC tear. He did test positive for several tests today but it was hard to pinpoint exactly what's going on because he is so painful with any overhead position and his shoulder has compensated well for his impairments. I would guess he has injured his RTC with how impaired his external rotation is,  thanks!   Carole Beckett

## 2020-09-16 ENCOUNTER — HOSPITAL ENCOUNTER (OUTPATIENT)
Dept: MRI IMAGING | Facility: HOSPITAL | Age: 30
Discharge: HOME | End: 2020-09-16
Attending: NURSE PRACTITIONER | Admitting: NURSE PRACTITIONER
Payer: COMMERCIAL

## 2020-09-16 DIAGNOSIS — M25.512 CHRONIC LEFT SHOULDER PAIN: ICD-10-CM

## 2020-09-16 DIAGNOSIS — G89.29 CHRONIC LEFT SHOULDER PAIN: ICD-10-CM

## 2020-09-16 PROCEDURE — 73221 MRI JOINT UPR EXTREM W/O DYE: CPT | Mod: TC,LT

## 2020-10-20 ENCOUNTER — OFFICE VISIT (OUTPATIENT)
Dept: FAMILY MEDICINE | Facility: OTHER | Age: 30
End: 2020-10-20
Attending: NURSE PRACTITIONER
Payer: COMMERCIAL

## 2020-10-20 DIAGNOSIS — Z23 NEED FOR PROPHYLACTIC VACCINATION AND INOCULATION AGAINST INFLUENZA: Primary | ICD-10-CM

## 2020-10-20 PROCEDURE — 90686 IIV4 VACC NO PRSV 0.5 ML IM: CPT

## 2020-10-20 PROCEDURE — 90471 IMMUNIZATION ADMIN: CPT

## 2021-02-01 NOTE — PROGRESS NOTES
"  Assessment & Plan     (H69.83) Eustachian tube dysfunction, bilateral  (primary encounter diagnosis)  Comment: no signs of infection, no cercumen build-up  Plan: Will treat with fluticasone (FLONASE) 50 MCG/ACT nasal spray and cetirizine (ZYRTEC) 10 MG tablet. Will then conduct phone call in 4 weeks. If no improvement, will refer to ENT.            Dodie Hebert NP  [facility] [place] Mejia is a 30 year old who presents to clinic today for the following health issues   HPI       Concern - ear pressure  Onset: off and on for 1-2 months  Description: both ears  Intensity: moderate  Progression of Symptoms:  worsening  Accompanying Signs & Symptoms: bilateral ear pressure, muffled sound. Denies drainage. Denies pain, just discomfort. He notes that it feels like his ears need to \"pop.\"  Previous history of similar problem: history of earwax build up. Does not feel the same  Precipitating factors:        Worsened by: none  Alleviating factors:        Improved by: none  Therapies tried and outcome: warm water flush and an at home wax remover kit.   -He quit smoking.   -He also notes that his hearing has decreased.   -No fevers. No chest pain or shortness of breath. No cough or cold like symptoms. No recent cold. No abdominal pain. Eating and drinking well.   -Has had hearing tests at work and they were normal.     Review of Systems   As noted in the HPI.       Objective    /76   Pulse 84   Temp 97.3  F (36.3  C) (Tympanic)   Wt 103.4 kg (228 lb)   SpO2 99%   BMI 28.88 kg/m    Body mass index is 28.88 kg/m .  Physical Exam   GENERAL: healthy, alert and no distress  EYES: Eyes grossly normal to inspection, PERRL and conjunctivae and sclerae normal  HENT: ear canals and TM's normal, nose and mouth without ulcers or lesions  NECK: no adenopathy, no asymmetry, masses, or scars and thyroid normal to palpation  RESP: lungs clear to auscultation - no rales, rhonchi or wheezes  CV: " regular rate and rhythm, no murmur, click or rub  MS: no gross musculoskeletal defects noted, no edema  NEURO: Normal strength and tone, mentation intact and speech normal  PSYCH: mentation appears normal, affect normal/bright

## 2021-02-03 ENCOUNTER — OFFICE VISIT (OUTPATIENT)
Dept: FAMILY MEDICINE | Facility: OTHER | Age: 31
End: 2021-02-03
Attending: NURSE PRACTITIONER
Payer: COMMERCIAL

## 2021-02-03 VITALS
WEIGHT: 228 LBS | TEMPERATURE: 97.3 F | BODY MASS INDEX: 28.88 KG/M2 | DIASTOLIC BLOOD PRESSURE: 76 MMHG | SYSTOLIC BLOOD PRESSURE: 114 MMHG | HEART RATE: 84 BPM | OXYGEN SATURATION: 99 %

## 2021-02-03 DIAGNOSIS — H69.93 EUSTACHIAN TUBE DYSFUNCTION, BILATERAL: Primary | ICD-10-CM

## 2021-02-03 PROBLEM — F17.290 CIGAR SMOKER: Status: RESOLVED | Noted: 2018-04-24 | Resolved: 2021-02-03

## 2021-02-03 PROCEDURE — 99213 OFFICE O/P EST LOW 20 MIN: CPT | Performed by: NURSE PRACTITIONER

## 2021-02-03 RX ORDER — FLUTICASONE PROPIONATE 50 MCG
1 SPRAY, SUSPENSION (ML) NASAL DAILY
Qty: 16 G | Refills: 1 | Status: SHIPPED | OUTPATIENT
Start: 2021-02-03 | End: 2021-04-27

## 2021-02-03 RX ORDER — CETIRIZINE HYDROCHLORIDE 10 MG/1
10 TABLET ORAL DAILY
Qty: 30 TABLET | Refills: 1 | Status: SHIPPED | OUTPATIENT
Start: 2021-02-03 | End: 2021-04-27

## 2021-02-03 ASSESSMENT — ANXIETY QUESTIONNAIRES
7. FEELING AFRAID AS IF SOMETHING AWFUL MIGHT HAPPEN: NOT AT ALL
3. WORRYING TOO MUCH ABOUT DIFFERENT THINGS: NOT AT ALL
IF YOU CHECKED OFF ANY PROBLEMS ON THIS QUESTIONNAIRE, HOW DIFFICULT HAVE THESE PROBLEMS MADE IT FOR YOU TO DO YOUR WORK, TAKE CARE OF THINGS AT HOME, OR GET ALONG WITH OTHER PEOPLE: NOT DIFFICULT AT ALL
GAD7 TOTAL SCORE: 1
1. FEELING NERVOUS, ANXIOUS, OR ON EDGE: NOT AT ALL
5. BEING SO RESTLESS THAT IT IS HARD TO SIT STILL: NOT AT ALL
2. NOT BEING ABLE TO STOP OR CONTROL WORRYING: NOT AT ALL
4. TROUBLE RELAXING: NOT AT ALL
6. BECOMING EASILY ANNOYED OR IRRITABLE: SEVERAL DAYS

## 2021-02-03 ASSESSMENT — PAIN SCALES - GENERAL: PAINLEVEL: NO PAIN (0)

## 2021-02-03 ASSESSMENT — PATIENT HEALTH QUESTIONNAIRE - PHQ9: SUM OF ALL RESPONSES TO PHQ QUESTIONS 1-9: 1

## 2021-02-03 NOTE — NURSING NOTE
"Chief Complaint   Patient presents with     Ear Problem       Initial /76   Pulse 84   Temp 97.3  F (36.3  C) (Tympanic)   Wt 103.4 kg (228 lb)   SpO2 99%   BMI 28.88 kg/m   Estimated body mass index is 28.88 kg/m  as calculated from the following:    Height as of 9/3/20: 1.892 m (6' 2.5\").    Weight as of this encounter: 103.4 kg (228 lb).  Medication Reconciliation: complete  Tamiko Hamilton MA  "

## 2021-02-04 ASSESSMENT — ANXIETY QUESTIONNAIRES: GAD7 TOTAL SCORE: 1

## 2021-03-02 NOTE — PROGRESS NOTES
"Roberto is a 30 year old who is being evaluated via a billable telephone visit.      What phone number would you like to be contacted at? [phone]  How would you like to obtain your AVS? Mail a copy    Assessment & Plan     Eustachian tube dysfunction, bilateral  Symptoms resolved. Ok to stop the Claritin and Flonase. Will return to the clinic if his symptoms return.    BMI:   Estimated body mass index is 28.88 kg/m  as calculated from the following:    Height as of 9/3/20: 1.892 m (6' 2.5\").    Weight as of 2/3/21: 103.4 kg (228 lb).       Dodie Hebert NP  [facility] [place]    Subjective   Roberto is a 30 year old who presents for the following health issues    HPI       Concern - bilateral ear pressure    Patient was seen on 2/3/21 with bilateral ear pressure. No signs of infection were noted. Diagnosed with eustachian tube dysfunction and treated with Flonase and Claritin.     Today he notes that he is feeling better; has not had ear pressure since taking the Claritin and Flonase.      Onset: approx 2 months   Description: ear pressure has subsided   Intensity: mild  Progression of Symptoms:  resolved  Accompanying Signs & Symptoms: no symptoms   Previous history of similar problem: previous ear pressure   Precipitating factors:        Worsened by: N/A  Alleviating factors:        Improved by: Flonase and Zyrtec   Therapies tried and outcome:  Flonase and Zyrtec   -He quit smoking.   -He also feels that his hearing has decreased, but he did have a hearing test at work recently and it was normal.   -No fevers. No chest pain or shortness of breath. No cough or cold like symptoms. No recent cold. No abdominal pain. Eating and drinking well.       Review of Systems   As noted in the HPI.       Objective           Vitals:  No vitals were obtained today due to virtual visit.    Physical Exam   healthy, alert and no distress  PSYCH: Alert and oriented times 3; coherent speech, normal   rate and " volume, able to articulate logical thoughts, able   to abstract reason, no tangential thoughts, no hallucinations   or delusions  His affect is normal  RESP: No cough, no audible wheezing, able to talk in full sentences  Remainder of exam unable to be completed due to telephone visits            Phone call duration: 5 minutes

## 2021-03-03 ENCOUNTER — VIRTUAL VISIT (OUTPATIENT)
Dept: FAMILY MEDICINE | Facility: OTHER | Age: 31
End: 2021-03-03
Attending: NURSE PRACTITIONER
Payer: COMMERCIAL

## 2021-03-03 DIAGNOSIS — H69.93 EUSTACHIAN TUBE DYSFUNCTION, BILATERAL: Primary | ICD-10-CM

## 2021-03-03 PROCEDURE — 99212 OFFICE O/P EST SF 10 MIN: CPT | Mod: 95 | Performed by: NURSE PRACTITIONER

## 2021-03-03 NOTE — NURSING NOTE
"Chief Complaint   Patient presents with     Ear Problem     follow up pressure in ears        Initial There were no vitals taken for this visit. Estimated body mass index is 28.88 kg/m  as calculated from the following:    Height as of 9/3/20: 1.892 m (6' 2.5\").    Weight as of 2/3/21: 103.4 kg (228 lb).  Medication Reconciliation: complete  Kathryn Lin LPN  "

## 2021-03-20 ENCOUNTER — HEALTH MAINTENANCE LETTER (OUTPATIENT)
Age: 31
End: 2021-03-20

## 2021-04-27 NOTE — PROGRESS NOTES
3  SUBJECTIVE:   CC: Roberto Meza is an 30 year old male who presents for preventive health visit.     Patient has been advised of split billing requirements and indicates understanding: Yes     Healthy Habits:    Do you get at least three servings of calcium containing foods daily (dairy, green leafy vegetables, etc.)? Yes    Amount of exercise or daily activities, outside of work: 3-4 day(s) per week    Problems taking medications regularly No    Medication side effects: No    Have you had an eye exam in the past two years? Yes, had one a week ago    Do you see a dentist twice per year? Yes, goes twice per year    Do you have sleep apnea, excessive snoring or daytime drowsiness?no    Patient recently treated for eustachian tube dysfunction with Claritin and Flonase. This has resolved and patient and discontinued the medications. He states that he has no concerns today.      Today's PHQ-2 Score:   PHQ-2 (  [employer]) 3/3/2021 9/3/2020   Q1: Little interest or pleasure in doing things 0 0   Q2: Feeling down, depressed or hopeless 0 0   PHQ-2 Score 0 0       Abuse: Current or Past(Physical, Sexual or Emotional)- No  Do you feel safe in your environment? Yes    Have you ever done Advance Care Planning? (For example, a Health Directive, POLST, or a discussion with a medical provider or your loved ones about your wishes): No, advance care planning information given to patient to review.  Patient plans to discuss their wishes with loved ones or provider.      Social History     Tobacco Use     Smoking status: Current Some Day Smoker     Packs/day: 1.00     Years: 10.00     Pack years: 10.00     Types: Pipe, Cigarettes     Start date: 2006     Last attempt to quit: 2020     Years since quittin.3     Smokeless tobacco: Never Used   Substance Use Topics     Alcohol use: Yes     Comment: drinks once a week     If you drink alcohol do you typically have >3 drinks per day or >7 drinks per week? No            "           Last PSA: No results found for: PSA      Reviewed and updated as needed this visit by clinical staff  Tobacco  Allergies  Meds    Surg Hx  Fam Hx          Reviewed and updated as needed this visit by Provider                No past medical history on file.   Past Surgical History:   Procedure Laterality Date     growth removal[  2001    left leg     HERNIA REPAIR  07/2019    [facility]       ROS:  CONSTITUTIONAL:POSITIVE  for weight loss and NEGATIVE  for fatigue and fever   INTEGUMENTARY/SKIN: NEGATIVE for worrisome rashes, moles or lesions  EYES: NEGATIVE for vision changes or irritation  ENT: NEGATIVE for ear, mouth and throat problems  RESP: NEGATIVE for significant cough or SOB  CV: NEGATIVE for chest pain, palpitations or peripheral edema  GI: NEGATIVE for nausea, abdominal pain, heartburn, or change in bowel habits   male: negative for dysuria, hematuria, decreased urinary stream, erectile dysfunction, urethral discharge  MUSCULOSKELETAL: NEGATIVE for significant arthralgias or myalgia  NEURO: NEGATIVE for weakness, dizziness or paresthesias  ENDOCRINE: NEGATIVE for temperature intolerance, skin/hair changes  HEME/ALLERGY/IMMUNE: NEGATIVE for bleeding problems  PSYCHIATRIC: NEGATIVE for changes in mood or affect    OBJECTIVE:   /60 (BP Location: Left arm, Patient Position: Sitting, Cuff Size: Adult Regular)   Pulse 84   Temp 98.2  F (36.8  C) (Tympanic)   Ht 1.892 m (6' 2.5\")   Wt 91.6 kg (202 lb)   SpO2 98%   BMI 25.59 kg/m       EXAM:  GENERAL: healthy, alert and no distress  EYES: Eyes grossly normal to inspection, PERRL and conjunctivae and sclerae normal  HENT: ear canals and TM's normal, nose and mouth without ulcers or lesions  NECK: no adenopathy, no asymmetry, masses, or scars and thyroid normal to palpation  RESP: lungs clear to auscultation - no rales, rhonchi or wheezes  CV: regular rate and rhythm, normal S1 S2, no S3 or S4, no murmur, click or rub, no peripheral " "edema and peripheral pulses strong  ABDOMEN: soft, nontender, no hepatosplenomegaly, no masses and bowel sounds normal   (male): normal male genitalia without lesions or urethral discharge, no hernia  MS: no gross musculoskeletal defects noted, no edema  SKIN: no suspicious lesions or rashes  NEURO: Normal strength and tone, mentation intact and speech normal  PSYCH: mentation appears normal, affect normal/bright  LYMPH: no cervical, supraclavicular, axillary, or inguinal adenopathy      ASSESSMENT/PLAN:     1. Routine general medical examination at a health care facility  Vaccines up to date. Colonoscopy at age 49. Follow up yearly or sooner with any new concerns.       Patient has been advised of split billing requirements and indicates understanding: Yes  COUNSELING:  Reviewed preventive health counseling, as reflected in patient instructions    Estimated body mass index is 25.59 kg/m  as calculated from the following:    Height as of this encounter: 1.892 m (6' 2.5\").    Weight as of this encounter: 91.6 kg (202 lb).    Weight management plan: Discussed healthy diet and exercise guidelines    He reports that he has been smoking pipe and cigarettes. He started smoking about 15 years ago. He has a 10.00 pack-year smoking history. He has never used smokeless tobacco.      Counseling Resources:  ATP IV Guidelines  Pooled Cohorts Equation Calculator  FRAX Risk Assessment  ICSI Preventive Guidelines  Dietary Guidelines for Americans, 2010  USDA's MyPlate  ASA Prophylaxis  Lung CA Screening     Raphael Kenney DNP Student - [facility]    Dodie Hebert, NP  [facility] - [place]  "

## 2021-04-27 NOTE — PATIENT INSTRUCTIONS
Preventive Health Recommendations  Male Ages 26 - 39    Yearly exam:             See your health care provider every year in order to  o   Review health changes.   o   Discuss preventive care.    o   Review your medicines if your doctor has prescribed any.    You should be tested each year for STDs (sexually transmitted diseases), if you re at risk.     After age 35, talk to your provider about cholesterol testing. If you are at risk for heart disease, have your cholesterol tested at least every 5 years.     If you are at risk for diabetes, you should have a diabetes test (fasting glucose).  Shots: Get a flu shot each year. Get a tetanus shot every 10 years.     Nutrition:    Eat at least 5 servings of fruits and vegetables daily.     Eat whole-grain bread, whole-wheat pasta and brown rice instead of white grains and rice.     Get adequate Calcium and Vitamin D.     Lifestyle    Exercise for at least 150 minutes a week (30 minutes a day, 5 days a week). This will help you control your weight and prevent disease.     Limit alcohol to one drink per day.     No smoking.     Wear sunscreen to prevent skin cancer.     See your dentist every six months for an exam and cleaning.

## 2021-04-28 ENCOUNTER — OFFICE VISIT (OUTPATIENT)
Dept: FAMILY MEDICINE | Facility: OTHER | Age: 31
End: 2021-04-28
Attending: NURSE PRACTITIONER
Payer: COMMERCIAL

## 2021-04-28 VITALS
WEIGHT: 202 LBS | OXYGEN SATURATION: 98 % | HEIGHT: 75 IN | DIASTOLIC BLOOD PRESSURE: 60 MMHG | SYSTOLIC BLOOD PRESSURE: 102 MMHG | HEART RATE: 84 BPM | TEMPERATURE: 98.2 F | BODY MASS INDEX: 25.12 KG/M2

## 2021-04-28 DIAGNOSIS — Z00.00 ROUTINE GENERAL MEDICAL EXAMINATION AT A HEALTH CARE FACILITY: Primary | ICD-10-CM

## 2021-04-28 PROCEDURE — 99395 PREV VISIT EST AGE 18-39: CPT | Performed by: NURSE PRACTITIONER

## 2021-04-28 ASSESSMENT — PAIN SCALES - GENERAL: PAINLEVEL: NO PAIN (0)

## 2021-04-28 ASSESSMENT — MIFFLIN-ST. JEOR: SCORE: 1948.96

## 2021-04-28 NOTE — NURSING NOTE
"Chief Complaint   Patient presents with     Physical       Initial Pulse 84   Temp 98.2  F (36.8  C) (Tympanic)   Ht 1.892 m (6' 2.5\")   Wt 91.6 kg (202 lb)   SpO2 98%   BMI 25.59 kg/m   Estimated body mass index is 25.59 kg/m  as calculated from the following:    Height as of this encounter: 1.892 m (6' 2.5\").    Weight as of this encounter: 91.6 kg (202 lb).  Medication Reconciliation: complete  Arthur Espinal LPN  "

## 2021-07-25 ENCOUNTER — HOSPITAL ENCOUNTER (EMERGENCY)
Facility: HOSPITAL | Age: 31
Discharge: HOME | End: 2021-07-25
Attending: PHYSICIAN ASSISTANT | Admitting: PHYSICIAN ASSISTANT
Payer: COMMERCIAL

## 2021-07-25 VITALS
SYSTOLIC BLOOD PRESSURE: 137 MMHG | OXYGEN SATURATION: 98 % | DIASTOLIC BLOOD PRESSURE: 77 MMHG | HEART RATE: 87 BPM | TEMPERATURE: 98.9 F | RESPIRATION RATE: 16 BRPM

## 2021-07-25 DIAGNOSIS — J06.9 UPPER RESPIRATORY TRACT INFECTION, UNSPECIFIED TYPE: ICD-10-CM

## 2021-07-25 DIAGNOSIS — J06.9 URI (UPPER RESPIRATORY INFECTION): ICD-10-CM

## 2021-07-25 LAB — SARS-COV-2 RNA RESP QL NAA+PROBE: NEGATIVE

## 2021-07-25 PROCEDURE — 99213 OFFICE O/P EST LOW 20 MIN: CPT | Performed by: PHYSICIAN ASSISTANT

## 2021-07-25 PROCEDURE — G0463 HOSPITAL OUTPT CLINIC VISIT: HCPCS

## 2021-07-25 PROCEDURE — U0003 INFECTIOUS AGENT DETECTION BY NUCLEIC ACID (DNA OR RNA); SEVERE ACUTE RESPIRATORY SYNDROME CORONAVIRUS 2 (SARS-COV-2) (CORONAVIRUS DISEASE [COVID-19]), AMPLIFIED PROBE TECHNIQUE, MAKING USE OF HIGH THROUGHPUT TECHNOLOGIES AS DESCRIBED BY CMS-2020-01-R: HCPCS | Performed by: PHYSICIAN ASSISTANT

## 2021-07-25 PROCEDURE — C9803 HOPD COVID-19 SPEC COLLECT: HCPCS

## 2021-07-25 NOTE — ED TRIAGE NOTES
Pt presents with headache, congestion, productive cough, loss of voice, diarrhea, chills, and head congestion. Sputum is yellow/white. Sx started 5 days ago. Pt has been taking Theraflu and dayquil. Pt is covid vaccinated and does not want a covid test.

## 2021-07-25 NOTE — ED PROVIDER NOTES
History     Chief Complaint   Patient presents with     URI     HPI  Roberto Meza is a 30 year old male who presents with complaints of nasal and sinus congestion with cough for 5 days with associated headache, chills, diarrhea.  Denies fever, shortness of breath.  Has been taking OTC medications with minimal relief.  Had COVID-19 shot in April      Allergies:  Allergies   Allergen Reactions     Bee Venom      Per patient is undiagnosed.     Bees        Problem List:    Patient Active Problem List    Diagnosis Date Noted     Gastric reflux 2018     Priority: Medium        Past Medical History:    No past medical history on file.    Social History:  Marital Status:  Single [1]  Social History     Tobacco Use     Smoking status: Current Some Day Smoker     Packs/day: 1.00     Years: 10.00     Pack years: 10.00     Types: Pipe, Cigarettes     Start date: 2006     Last attempt to quit: 2020     Years since quittin.5     Smokeless tobacco: Never Used   Substance Use Topics     Alcohol use: Yes     Comment: drinks once a week     Drug use: No        Medications:    No current outpatient medications on file.        Review of Systems :  Constitutional: Negative for fever.   HENT: Positive for congestion and sinus pressure. Pos for sore throat.    Respiratory: Positive for cough. Negative for shortness of breath.      Physical Exam   BP: 137/77  Pulse: 87  Temp: 98.9  F (37.2  C)  Resp: 16  SpO2: 98 %      Physical Exam   Constitutional: Well-developed and well-nourished. Appears moderately ill.  Head: Normocephalic and atraumatic.   Eyes: Conjunctivae and EOM are normal.  BHAVIN.  Ears: TMs normal bilaterally  Nose: Congested with no sinus tenderness  Throat: no erythema or significant tonsillar swelling  Neck: Normal range of motion. No palpable lymphadenopathy  Cardiovascular: Normal rate and regular rhythm.   Pulmonary/Chest: Effort normal and CTAB.  Skin: Skin is warm and dry. No rash noted.      ED Course               Results for orders placed or performed during the hospital encounter of 07/25/21 (from the past 24 hour(s))   Symptomatic COVID-19 Virus (Coronavirus) by PCR Nasopharyngeal    Specimen: Nasopharyngeal; Swab    Narrative    The following orders were created for panel order Symptomatic COVID-19 Virus (Coronavirus) by PCR Nasopharyngeal.  Procedure                               Abnormality         Status                     ---------                               -----------         ------                     SARS-COV2 (COVID-19) Vir...[[id number]]  Normal              Final result                 Please view results for these tests on the individual orders.   SARS-COV2 (COVID-19) Virus RT-PCR    Specimen: Nasopharyngeal; Swab   Result Value Ref Range    SARS CoV2 PCR Negative Negative    Narrative    Testing was performed using the QuVISert Xpress SARS-CoV-2 Assay on the   Cepheid Gene-Xpert Instrument Systems. Additional information about   this Emergency Use Authorization (EUA) assay can be found via the Lab   Guide. This test should be ordered for the detection of SARS-CoV-2 in   individuals who meet SARS-CoV-2 clinical and/or epidemiological   criteria. Test performance is unknown in asymptomatic patients. This   test is for in vitro diagnostic use under the FDA EUA for   laboratories certified under CLIA to perform high complexity testing.   This test has not been FDA cleared or approved. A negative result   does not rule out the presence of PCR inhibitors in the specimen or   target RNA in concentration below the limit of detection for the   assay. The possibility of a false negative should be considered if   the patient's recent exposure or clinical presentation suggests   COVID-19. This test was validated by [facility]. This laboratory is certified under the Clinical Laboratory Improve  ment Amendments (CLIA) as qualified to perform high complexity  testing.       Medications - No data to display    Assessments & Plan (with Medical Decision Making)     I have reviewed the nursing notes.    I have reviewed the findings, diagnosis, plan and need for follow up with the patient.  May use Afrin nasal spray (oxymetazoline) two sprays in each nostril twice daily for up to three days.  Wait 15 minutes and try Neti pot or sinus rinse as tolerated or try saline nasal sprays throughout the day.  May take up to 600 mg ibuprofen every 6 hours as needed for pain.        Medication List      There are no discharge medications for this visit.         Final diagnoses:   URI (upper respiratory infection)       LEANDRO James on 7/25/2021 at 11:52 AM   7/25/2021   [facility]        Sorin Ayoub PA  07/25/21 [id number]

## 2022-01-26 ENCOUNTER — APPOINTMENT (OUTPATIENT)
Dept: CHIROPRACTIC MEDICINE | Facility: OTHER | Age: 32
End: 2022-01-26

## 2022-01-26 ENCOUNTER — APPOINTMENT (OUTPATIENT)
Dept: OCCUPATIONAL MEDICINE | Facility: OTHER | Age: 32
End: 2022-01-26

## 2022-01-26 PROCEDURE — 99499 UNLISTED E&M SERVICE: CPT

## 2022-04-22 ENCOUNTER — HOSPITAL ENCOUNTER (EMERGENCY)
Facility: HOSPITAL | Age: 32
Discharge: HOME | End: 2022-04-22
Attending: NURSE PRACTITIONER | Admitting: NURSE PRACTITIONER
Payer: COMMERCIAL

## 2022-04-22 VITALS
RESPIRATION RATE: 16 BRPM | TEMPERATURE: 99 F | SYSTOLIC BLOOD PRESSURE: 130 MMHG | HEART RATE: 84 BPM | DIASTOLIC BLOOD PRESSURE: 88 MMHG | OXYGEN SATURATION: 98 %

## 2022-04-22 DIAGNOSIS — M25.512 LEFT ANTERIOR SHOULDER PAIN: Primary | ICD-10-CM

## 2022-04-22 PROCEDURE — G0463 HOSPITAL OUTPT CLINIC VISIT: HCPCS

## 2022-04-22 PROCEDURE — 99213 OFFICE O/P EST LOW 20 MIN: CPT | Performed by: NURSE PRACTITIONER

## 2022-04-22 PROCEDURE — 93010 ELECTROCARDIOGRAM REPORT: CPT | Performed by: INTERNAL MEDICINE

## 2022-04-22 PROCEDURE — 93005 ELECTROCARDIOGRAM TRACING: CPT

## 2022-04-22 RX ORDER — PANTOPRAZOLE SODIUM 40 MG/10ML
INJECTION, POWDER, LYOPHILIZED, FOR SOLUTION INTRAVENOUS
Status: DISCONTINUED
Start: 2022-04-22 | End: 2022-04-22 | Stop reason: WASHOUT

## 2022-04-22 ASSESSMENT — ENCOUNTER SYMPTOMS
FEVER: 0
MYALGIAS: 1

## 2022-04-23 NOTE — ED PROVIDER NOTES
History     Chief Complaint   Patient presents with     Shoulder Pain     HPI  Roberto Meza is a 31 year old male who presents ambulatory to urgent care for evaluation of left shoulder pain.  Patient tells me that he had a long car ride earlier today.  He was drinking energy drinks, coffee and other caffeinated beverages in order to stay awake.  He got home took a nap and when he woke up he was feeling a little jittery.  He then started having pain to the left anterior shoulder.  No numbness or tingling.  He is still moving his left shoulder and arm with minimal difficulty.  He has not taken anything for pain.  No cardiac history.  Denies any direct trauma or injury to his shoulder.    Allergies:  Allergies   Allergen Reactions     Bee Venom      Per patient is undiagnosed.     Bees        Problem List:    Patient Active Problem List    Diagnosis Date Noted     Gastric reflux 2018     Priority: Medium        Past Medical History:    History reviewed. No pertinent past medical history.    Past Surgical History:    Past Surgical History:   Procedure Laterality Date     growth removal[      left leg     HERNIA REPAIR  2019    [facility]       Family History:    History reviewed. No pertinent family history.    Social History:  Marital Status:   [2]  Social History     Tobacco Use     Smoking status: Current Some Day Smoker     Packs/day: 1.00     Years: 10.00     Pack years: 10.00     Types: Pipe, Cigarettes     Start date: 2006     Last attempt to quit: 2020     Years since quittin.3     Smokeless tobacco: Never Used   Substance Use Topics     Alcohol use: Yes     Comment: drinks once a week     Drug use: No        Medications:    No current outpatient medications on file.        Review of Systems   Constitutional: Negative for fever.   Musculoskeletal: Positive for myalgias.   All other systems reviewed and are negative.      Physical Exam   BP: 130/88  Pulse: 84  Temp: 99  F  (37.2  C)  Resp: 16  SpO2: 98 %      Physical Exam  Vitals and nursing note reviewed.   Constitutional:       Appearance: Normal appearance. He is not ill-appearing or toxic-appearing.   HENT:      Head: Normocephalic.   Eyes:      Pupils: Pupils are equal, round, and reactive to light.   Cardiovascular:      Rate and Rhythm: Normal rate and regular rhythm.      Heart sounds: Normal heart sounds. No murmur heard.  Pulmonary:      Effort: Pulmonary effort is normal.   Musculoskeletal:         General: No swelling, deformity or signs of injury. Normal range of motion.      Right shoulder: Normal. No swelling or deformity. Normal range of motion.      Left shoulder: Tenderness present. No swelling, deformity, effusion, laceration, bony tenderness or crepitus. Normal range of motion. Normal strength. Normal pulse.      Cervical back: Neck supple.      Comments: Full range of motion to left shoulder.  Localized tenderness to palpation to anterior left shoulder joint.  No obvious deformity.  No swelling.  No redness or bruising.   Skin:     General: Skin is warm and dry.      Capillary Refill: Capillary refill takes less than 2 seconds.      Coloration: Skin is not pale.      Findings: No bruising or erythema.   Neurological:      Mental Status: He is alert and oriented to person, place, and time.         ED Course                 Procedures              EKG Interpretation:      Interpreted by Alice Torres CNP  Time reviewed: 2110  Symptoms at time of EKG: left shoulder pain   Rhythm: normal sinus   Rate: normal  Axis: normal  Ectopy: none  Conduction: normal  ST Segments/ T Waves: No ST-T wave changes  Q Waves: none  Comparison to prior: Unchanged    Clinical Impression: normal EKG           No results found for this or any previous visit (from the past 24 hour(s)).    Medications - No data to display    Assessments & Plan (with Medical Decision Making)     I have reviewed the nursing notes.    31-year-old male that  presented for evaluation of anterior left shoulder pain that started earlier today.  The patient concerned that this is related to his increased caffeine intake today.  He does have full range of motion to his left shoulder.  Tenderness to palpation to anterior left shoulder joint.  Heart rate and rhythm are regular.  His vital signs are within normal limits.    Low concern for fracture as there was no injury to his left shoulder and patient does have full range of motion.  Discussed the possibility of cardiac etiology. EKG shows a normal sinus rhythm with no significant ST or T wave changes.  His heart rate and rhythm are regular.  Symptoms at this time are more suspicious for musculoskeletal pain.  At this time no findings that warrant further work-up.  I did recommend the patient treat the pain with Tylenol or ibuprofen as needed.  Apply cold packs to the affected area.  If symptoms were to worsen either later tonight or tomorrow strongly encouraged patient to return to the emergency department for reevaluation.    I have reviewed the findings, diagnosis, plan and need for follow up with the patient.  This document was prepared using a combination of typing and voice generated software.  While every attempt was made for accuracy, spelling and grammatical errors may exist.    New Prescriptions    No medications on file       Final diagnoses:   Left anterior shoulder pain       4/22/2022   [facility]     Mpofu, Heribertoudence, CNP  04/23/22 1017

## 2022-04-23 NOTE — DISCHARGE INSTRUCTIONS
Take Tylenol or ibuprofen as needed for pain.  Apply ice packs to the affected area 15 minutes on/off.    Follow-up with your doctor if no improvement in symptoms.    Return to emergency department for worsening or concerning symptoms.

## 2022-04-23 NOTE — ED TRIAGE NOTES
Pt c/o left shoulder pain. States he was on a road trip today and feels he ate wromg or had to many energy drinks.

## 2022-04-23 NOTE — ED TRIAGE NOTES
Patient comes to UC w/c of left shoulder pain d/t drinking too much caffeine. Denies pain   Alert and oriented to person, place and time

## 2022-04-24 ENCOUNTER — HOSPITAL ENCOUNTER (EMERGENCY)
Facility: HOSPITAL | Age: 32
Discharge: HOME | End: 2022-04-24
Attending: PHYSICIAN ASSISTANT | Admitting: PHYSICIAN ASSISTANT
Payer: COMMERCIAL

## 2022-04-24 VITALS
RESPIRATION RATE: 16 BRPM | SYSTOLIC BLOOD PRESSURE: 122 MMHG | DIASTOLIC BLOOD PRESSURE: 72 MMHG | TEMPERATURE: 99 F | OXYGEN SATURATION: 97 % | HEART RATE: 82 BPM

## 2022-04-24 DIAGNOSIS — K08.89 PAIN, DENTAL: ICD-10-CM

## 2022-04-24 PROCEDURE — 99213 OFFICE O/P EST LOW 20 MIN: CPT | Performed by: PHYSICIAN ASSISTANT

## 2022-04-24 PROCEDURE — G0463 HOSPITAL OUTPT CLINIC VISIT: HCPCS

## 2022-04-24 RX ORDER — AMOXICILLIN 875 MG
875 TABLET ORAL 2 TIMES DAILY
Qty: 14 TABLET | Refills: 0 | Status: SHIPPED | OUTPATIENT
Start: 2022-04-24 | End: 2022-05-01

## 2022-04-24 NOTE — ED TRIAGE NOTES
Pt presented to triage with pain and swelling in his mouth.  Had filling placed 3 days ago.  Now has possible infection at site when the lidocaine injection was done.  No fevers.  Eating and drinking without difficulty.  Pain 2/10

## 2022-04-24 NOTE — ED PROVIDER NOTES
History     Chief Complaint   Patient presents with     Mouth Lesions     Possible infection at injection site      HPI  Roberto Meza is a 31 year old male who presents to urgent care for evaluation of possible infection to mouth.  Patient states that 3 days ago he had a filling placed in his left upper molar.  He states that over the inside of his upper mouth where the injected Novocain to numb the tooth he has a small area of infection.  Patient states that he noted this area the day after his procedure and states that it has grown in size.  He states that it is slightly painful especially with palpation.  He denies any fevers, or bad taste in his mouth.    Allergies:  Allergies   Allergen Reactions     Bee Venom      Per patient is undiagnosed.     Bees        Problem List:    Patient Active Problem List    Diagnosis Date Noted     Gastric reflux 2018     Priority: Medium        Past Medical History:    No past medical history on file.    Past Surgical History:    Past Surgical History:   Procedure Laterality Date     growth removal[      left leg     HERNIA REPAIR  2019    [facility]       Family History:    No family history on file.    Social History:  Marital Status:   [2]  Social History     Tobacco Use     Smoking status: Current Some Day Smoker     Packs/day: 1.00     Years: 10.00     Pack years: 10.00     Types: Pipe, Cigarettes     Start date: 2006     Last attempt to quit: 2020     Years since quittin.3     Smokeless tobacco: Never Used   Substance Use Topics     Alcohol use: Yes     Comment: drinks once a week     Drug use: No        Medications:    amoxicillin (AMOXIL) 875 MG tablet          Review of Systems   HENT: Positive for dental problem.    All other systems reviewed and are negative.      Physical Exam   BP: 122/72  Pulse: 82  Temp: 99  F (37.2  C)  Resp: 16  SpO2: 97 %      Physical Exam  Vitals and nursing note reviewed.   Constitutional:        Appearance: Normal appearance. He is not ill-appearing.   HENT:      Mouth/Throat:        Comments: Patient has small area of ulceration present over the upper left side of hard palate just medial to left upper molar.  This area is nonfluctuant and not actively draining.  It is slightly tender with palpation.  Cardiovascular:      Rate and Rhythm: Regular rhythm.      Heart sounds: Normal heart sounds.   Pulmonary:      Breath sounds: Normal breath sounds.   Neurological:      Mental Status: He is oriented to person, place, and time.         ED Course                 Procedures             Critical Care time:               No results found for this or any previous visit (from the past 24 hour(s)).    Medications - No data to display    Assessments & Plan (with Medical Decision Making)   #1.  Dental pain    Discussed exam findings with patient.  Patient is prescribed amoxicillin for suspected dental infection.  Patient is also instructed to perform warm salt water gargles and take Tylenol or ibuprofen as directed for pain.  I recommend patient that he contact his dentist to perform the procedure to follow-up.  Any additional concerns in the meantime please return to urgent care or follow-up with primary care provider.  Patient verbalized understanding and agreement of plan.    I have reviewed the nursing notes.    I have reviewed the findings, diagnosis, plan and need for follow up with the patient.    Discharge Medication List as of 4/24/2022  5:11 PM      START taking these medications    Details   amoxicillin (AMOXIL) 875 MG tablet Take 1 tablet (875 mg) by mouth 2 times daily for 7 days, Disp-14 tablet, R-0, E-Prescribe             Final diagnoses:   Pain, dental       4/24/2022   [facility]     Benji Baum PA-C  04/24/22 4722

## 2022-05-16 NOTE — PROGRESS NOTES
SUBJECTIVE:   CC: Roberto Meza is an 31 year old male who presents for preventative health visit.     Patient has been advised of split billing requirements and indicates understanding: Yes     Healthy Habits:     Getting at least 3 servings of Calcium per day:  NO    Bi-annual eye exam:  Yes    Dental care twice a year:  Yes    Sleep apnea or symptoms of sleep apnea:  None    Diet:  Regular (no restrictions)    Frequency of exercise:  2-3 days/week    Duration of exercise:  45-60 minutes    Taking medications regularly:  Not Applicable    Medication side effects:  Not applicable    PHQ-2 Total Score: 0    Additional concerns today:  No    Recently quit Smoking 2022 .     Today's PHQ-2 Score:   PHQ-2 (  [employer]) 2022   Q1: Little interest or pleasure in doing things 0   Q2: Feeling down, depressed or hopeless 0   PHQ-2 Score 0   PHQ-2 Total Score (12-17 Years)- Positive if 3 or more points; Administer PHQ-A if positive -   Q1: Little interest or pleasure in doing things Not at all   Q2: Feeling down, depressed or hopeless Not at all   PHQ-2 Score 0       Abuse: Current or Past(Physical, Sexual or Emotional)- No  Do you feel safe in your environment? Yes        Social History     Tobacco Use     Smoking status: Current Some Day Smoker     Packs/day: 1.00     Years: 10.00     Pack years: 10.00     Types: Pipe, Cigarettes     Start date: 2006     Last attempt to quit: 2020     Years since quittin.3     Smokeless tobacco: Never Used   Substance Use Topics     Alcohol use: Yes     Comment: drinks once a week     Rarely drinks alcohol.     Last PSA: No results found for: PSA    Reviewed orders with patient. Reviewed health maintenance and updated orders accordingly - Yes  BP Readings from Last 3 Encounters:   22 124/74   22 122/72   22 130/88    Wt Readings from Last 3 Encounters:   22 95.3 kg (210 lb)   21 91.6 kg (202 lb)   21 103.4 kg (228 lb)                   Patient Active Problem List   Diagnosis     Gastric reflux     Past Surgical History:   Procedure Laterality Date     growth removal[      left leg     HERNIA REPAIR  2019    [facility]       Social History     Tobacco Use     Smoking status: Current Some Day Smoker     Packs/day: 1.00     Years: 10.00     Pack years: 10.00     Types: Pipe, Cigarettes     Start date: 2006     Last attempt to quit: 2020     Years since quittin.3     Smokeless tobacco: Never Used   Substance Use Topics     Alcohol use: Yes     Comment: drinks once a week     Family History   Problem Relation Age of Onset     Colon Cancer No family hx of          No current outpatient medications on file.       Reviewed and updated as needed this visit by clinical staff   Tobacco  Allergies    Med Hx  Surg Hx  Fam Hx            Reviewed and updated as needed this visit by Provider       Med Hx  Surg Hx  Fam Hx           History reviewed. No pertinent past medical history.   Past Surgical History:   Procedure Laterality Date     growth removal[      left leg     HERNIA REPAIR  2019    [facility]       Review of Systems  CONSTITUTIONAL: NEGATIVE for fever, chills, change in weight  INTEGUMENTARY/SKIN: NEGATIVE for worrisome rashes, moles or lesions  EYES: NEGATIVE for vision changes or irritation  ENT: NEGATIVE for ear, mouth and throat problems  RESP: NEGATIVE for significant cough or SOB  CV: NEGATIVE for chest pain, palpitations or peripheral edema  GI: NEGATIVE for nausea, abdominal pain, heartburn, or change in bowel habits   male: negative for dysuria, hematuria, decreased urinary stream, erectile dysfunction, urethral discharge  MUSCULOSKELETAL: NEGATIVE for significant arthralgias or myalgia  NEURO: NEGATIVE for weakness, dizziness or paresthesias  PSYCHIATRIC: NEGATIVE for changes in mood or affect    OBJECTIVE:   /74 (BP Location: Right arm, Patient Position: Chair, Cuff Size: Adult Large)   " Pulse 70   Temp 97.8  F (36.6  C) (Tympanic)   Ht 1.892 m (6' 2.5\")   Wt 95.3 kg (210 lb)   SpO2 95%   BMI 26.60 kg/m      Physical Exam  GENERAL: healthy, alert and no distress  EYES: Eyes grossly normal to inspection, PERRL and conjunctivae and sclerae normal  HENT: ear canals and TM's normal, nose and mouth without ulcers or lesions  NECK: no adenopathy, no asymmetry, masses, or scars and thyroid normal to palpation  RESP: lungs clear to auscultation - no rales, rhonchi or wheezes  CV: regular rate and rhythm, normal S1 S2, no S3 or S4, no murmur, click or rub, no peripheral edema and peripheral pulses strong  ABDOMEN: soft, nontender, no hepatosplenomegaly, no masses and bowel sounds normal   (male): declined  MS: no gross musculoskeletal defects noted, no edema  SKIN: no suspicious lesions or rashes  NEURO: Normal strength and tone, mentation intact and speech normal  PSYCH: mentation appears normal, affect normal/bright      ASSESSMENT/PLAN:   (Z00.00) Routine general medical examination at a health care facility  (primary encounter diagnosis)  Plan: No concerns noted. Vaccines UTD. Fasting labs normal in 2020. Did not recheck today.     Patient has been advised of split billing requirements and indicates understanding: Yes    COUNSELING:   Reviewed preventive health counseling, as reflected in patient instructions       Regular exercise       Healthy diet/nutrition       Vision screening       Hearing screening       Consider Hep C screening for all patients one time for ages 18-79 years    Estimated body mass index is 26.6 kg/m  as calculated from the following:    Height as of this encounter: 1.892 m (6' 2.5\").    Weight as of this encounter: 95.3 kg (210 lb).         He reports that he has been smoking pipe and cigarettes. He started smoking about 16 years ago. He has a 10.00 pack-year smoking history. He has never used smokeless tobacco.  Tobacco Cessation Action Plan:   Information offered: " Patient not interested at this time      Counseling Resources:  ATP IV Guidelines  Pooled Cohorts Equation Calculator  FRAX Risk Assessment  ICSI Preventive Guidelines  Dietary Guidelines for Americans, 2010  USDA's MyPlate  ASA Prophylaxis  Lung CA Screening    Dodie Hebert, BRADEN  [facility]

## 2022-05-18 ENCOUNTER — OFFICE VISIT (OUTPATIENT)
Dept: FAMILY MEDICINE | Facility: OTHER | Age: 32
End: 2022-05-18
Attending: NURSE PRACTITIONER
Payer: COMMERCIAL

## 2022-05-18 VITALS
HEART RATE: 70 BPM | TEMPERATURE: 97.8 F | HEIGHT: 75 IN | OXYGEN SATURATION: 95 % | WEIGHT: 210 LBS | BODY MASS INDEX: 26.11 KG/M2 | SYSTOLIC BLOOD PRESSURE: 124 MMHG | DIASTOLIC BLOOD PRESSURE: 74 MMHG

## 2022-05-18 DIAGNOSIS — Z00.00 ROUTINE GENERAL MEDICAL EXAMINATION AT A HEALTH CARE FACILITY: Primary | ICD-10-CM

## 2022-05-18 PROCEDURE — 99395 PREV VISIT EST AGE 18-39: CPT | Performed by: NURSE PRACTITIONER

## 2022-05-18 ASSESSMENT — PAIN SCALES - GENERAL: PAINLEVEL: NO PAIN (0)

## 2022-05-18 NOTE — NURSING NOTE
"Chief Complaint   Patient presents with     Physical     Complete physical        Initial There were no vitals taken for this visit. Estimated body mass index is 25.59 kg/m  as calculated from the following:    Height as of 4/28/21: 1.892 m (6' 2.5\").    Weight as of 4/28/21: 91.6 kg (202 lb).  Medication Reconciliation: complete  Kathryn Lin LPN  "

## 2022-07-05 ENCOUNTER — HOSPITAL ENCOUNTER (EMERGENCY)
Facility: HOSPITAL | Age: 32
Discharge: HOME | End: 2022-07-05
Attending: NURSE PRACTITIONER | Admitting: NURSE PRACTITIONER
Payer: COMMERCIAL

## 2022-07-05 ENCOUNTER — TELEPHONE (OUTPATIENT)
Dept: NURSING | Facility: CLINIC | Age: 32
End: 2022-07-05

## 2022-07-05 ENCOUNTER — NURSE TRIAGE (OUTPATIENT)
Dept: NURSING | Facility: CLINIC | Age: 32
End: 2022-07-05

## 2022-07-05 VITALS
DIASTOLIC BLOOD PRESSURE: 85 MMHG | HEART RATE: 106 BPM | SYSTOLIC BLOOD PRESSURE: 126 MMHG | RESPIRATION RATE: 18 BRPM | TEMPERATURE: 100 F | OXYGEN SATURATION: 93 %

## 2022-07-05 DIAGNOSIS — J20.9 ACUTE BRONCHITIS: Primary | ICD-10-CM

## 2022-07-05 DIAGNOSIS — U07.1 LAB TEST POSITIVE FOR DETECTION OF COVID-19 VIRUS: ICD-10-CM

## 2022-07-05 DIAGNOSIS — J06.9 VIRAL URI: ICD-10-CM

## 2022-07-05 LAB
FLUAV RNA SPEC QL NAA+PROBE: NEGATIVE
FLUBV RNA RESP QL NAA+PROBE: NEGATIVE
RSV RNA SPEC NAA+PROBE: NEGATIVE
SARS-COV-2 RNA RESP QL NAA+PROBE: POSITIVE

## 2022-07-05 PROCEDURE — G0463 HOSPITAL OUTPT CLINIC VISIT: HCPCS

## 2022-07-05 PROCEDURE — 87637 SARSCOV2&INF A&B&RSV AMP PRB: CPT | Performed by: NURSE PRACTITIONER

## 2022-07-05 PROCEDURE — 99213 OFFICE O/P EST LOW 20 MIN: CPT | Performed by: NURSE PRACTITIONER

## 2022-07-05 RX ORDER — PREDNISONE 20 MG/1
TABLET ORAL
Qty: 10 TABLET | Refills: 0 | Status: SHIPPED | OUTPATIENT
Start: 2022-07-05 | End: 2022-11-04

## 2022-07-05 ASSESSMENT — ENCOUNTER SYMPTOMS
NAUSEA: 0
VOMITING: 0
FEVER: 1
DIARRHEA: 0
APPETITE CHANGE: 0
RHINORRHEA: 1
COUGH: 1
SHORTNESS OF BREATH: 0

## 2022-07-05 NOTE — ED TRIAGE NOTES
Patient presents with 2 day history of cough, fever and body aches.  Patient notes his work required him to come in for testing/evaluation.

## 2022-07-05 NOTE — ED PROVIDER NOTES
History     Chief Complaint   Patient presents with     Fever     Cough     HPI  Roberto Meza is a 31 year old male who presents ambulatory to urgent care requesting COVID-19 testing.  Over the last 2 days the patient has had a dry cough, fever, headache, body aches and chest pain with coughing.  Symptoms are much better today.  No shortness of breath, nausea, vomiting or diarrhea.   He is drinking fluids well.  Reports that there are lot of people at work and where he lives that has recently tested positive for COVID-19. He has had COVID-19 vaccines including a booster.     Allergies:  Allergies   Allergen Reactions     Bee Venom      Per patient is undiagnosed.     Bees        Problem List:    Patient Active Problem List    Diagnosis Date Noted     Gastric reflux 2018     Priority: Medium        Past Medical History:    History reviewed. No pertinent past medical history.    Past Surgical History:    Past Surgical History:   Procedure Laterality Date     growth removal[      left leg     HERNIA REPAIR  2019    Chely Ray       Family History:    Family History   Problem Relation Age of Onset     Colon Cancer No family hx of        Social History:  Marital Status:   [2]  Social History     Tobacco Use     Smoking status: Current Some Day Smoker     Packs/day: 1.00     Years: 10.00     Pack years: 10.00     Types: Pipe, Cigarettes     Start date: 2006     Last attempt to quit: 2020     Years since quittin.5     Smokeless tobacco: Never Used   Substance Use Topics     Alcohol use: Yes     Comment: drinks once a week     Drug use: No        Medications:    predniSONE (DELTASONE) 20 MG tablet          Review of Systems   Constitutional: Positive for fever (low grade). Negative for appetite change.   HENT: Positive for congestion and rhinorrhea.    Respiratory: Positive for cough. Negative for shortness of breath.    Cardiovascular: Positive for chest pain (with coughing).    Gastrointestinal: Negative for diarrhea, nausea and vomiting.   All other systems reviewed and are negative.      Physical Exam   BP: 126/85  Pulse: 106  Temp: 100  F (37.8  C)  Resp: 18  SpO2: 93 %      Physical Exam  Vitals and nursing note reviewed.   Constitutional:       Appearance: Normal appearance. He is not ill-appearing or toxic-appearing.   HENT:      Head: Atraumatic.      Right Ear: Tympanic membrane and ear canal normal.      Left Ear: Tympanic membrane and ear canal normal.      Nose: Nose normal.      Mouth/Throat:      Mouth: Mucous membranes are moist.      Pharynx: No posterior oropharyngeal erythema.   Eyes:      Pupils: Pupils are equal, round, and reactive to light.   Cardiovascular:      Rate and Rhythm: Normal rate and regular rhythm.   Pulmonary:      Effort: Pulmonary effort is normal. No respiratory distress.      Breath sounds: Normal breath sounds. No wheezing.   Musculoskeletal:         General: Normal range of motion.      Cervical back: Neck supple.   Skin:     General: Skin is warm and dry.      Capillary Refill: Capillary refill takes less than 2 seconds.   Neurological:      Mental Status: He is alert and oriented to person, place, and time.         ED Course                 Procedures            No results found for this or any previous visit (from the past 24 hour(s)).    Medications - No data to display    Assessments & Plan (with Medical Decision Making)     I have reviewed the nursing notes.    31-year-old male that presented requesting COVID-19 testing per advisement of his employer.  Patient developed URI symptoms 2 days ago which he states are gradually improving.  His heart rate and rhythm is regular.  His respirations are nonlabored.  His vital signs are within normal limits.    Respiratory viral panel results pending.  Discussed quarantine guidelines with patient.  Tylenol or ibuprofen as needed for fever or pain.  Push fluids.  Follow-up with PCP if no improvement in  symptoms.  Return to ED/UC for worsening or concerning symptoms.    I have reviewed the findings, diagnosis, plan and need for follow up with the patient.  This document was prepared using a combination of typing and voice generated software.  While every attempt was made for accuracy, spelling and grammatical errors may exist.    Discharge Medication List as of 7/5/2022  9:25 AM      START taking these medications    Details   predniSONE (DELTASONE) 20 MG tablet Take two tablets (= 40mg) each day for 5 (five) days, Disp-10 tablet, R-0, E-Prescribe             Final diagnoses:   Acute bronchitis   Viral URI       7/5/2022   [facility]     Mpofu, Prudence, CNP  07/07/22 1408       Mpofu, Prudence, CNP  07/07/22 1408

## 2022-07-05 NOTE — Clinical Note
Roberto Meza was seen and treated in our emergency department on 7/5/2022.    Please excuse him from work pending his COVID-19 result. He will notify you of results. If it comes back positive, please follow guidelines as per CDC.      Sincerely,     [facility]

## 2022-07-05 NOTE — TELEPHONE ENCOUNTER
Patient classified as COVID treatment eligible by Epic high risk algorithm:  No    Coronavirus (COVID-19) Notification    Reason for call  Notify of POSITIVE COVID-19 lab result, assess symptoms,  review [facility] recommendations    Lab Result   Lab test for 2019-nCoV rRt-PCR or SARS-COV-2 PCR  Oropharyngeal AND/OR nasopharyngeal swabs were POSITIVE for 2019-nCoV RNA [OR] SARS-COV-2 RNA (COVID-19) RNA     We have been unable to reach patient by phone at this time to notify of their Positive COVID-19 result.    Left voicemail message requesting a call back to [phone] [facility] for results.        A Positive COVID-19 letter will be sent via [employer] or the mail. (Exception, no letters sent to Presurgerical/Preprocedure Patients)    Francesca Cohen

## 2022-07-05 NOTE — ED TRIAGE NOTES
Pt presents with fever, cough, fatigue, and headache for past two days. Pt denies otc medication today.

## 2022-07-05 NOTE — DISCHARGE INSTRUCTIONS
Take steroid as prescribed until finished.     Tylenol or ibuprofen as needed for pain or fever.     We will notify you of results when available.     Follow up with your doctor if no improvement in symptoms.    Return to emergency department for worsening or concerning symptoms.

## 2022-07-05 NOTE — TELEPHONE ENCOUNTER
Nurse Triage SBAR    Is this a 2nd Level Triage? NO    Situation: Covid.    Background: Patient calling. Went to urgent care today.     Assessment: Tested positive for covid. This morning 100.0 fever, achy, fatigues and coughing. Nasal congestion. Temp 97.6. Feeling okay now - no longer coughing. States his only symptoms is really a runny nose.     Protocol Recommended Disposition: Home care/ Telephone Advise    Recommendation: According to the protocol, Patient should do home care. Home care reviewed. Advised Patient to do home care. Home care reviewed. Care advice given. Patient verbalizes understanding and agrees with plan of care. Reviewed concerning symptoms and when to call back.      COVID 19 Nurse Triage Plan/Patient Instructions    Please be aware that novel coronavirus (COVID-19) may be circulating in the community. If you develop symptoms such as fever, cough, or SOB or if you have concerns about the presence of another infection including coronavirus (COVID-19), please contact your health care provider or visit [email or web address].     Disposition/Instructions    Home care recommended. Follow home care protocol based instructions.    Thank you for taking steps to prevent the spread of this virus.  o Limit your contact with others.  o Wear a simple mask to cover your cough.  o Wash your hands well and often.    Resources    [facility]: About COVID-19: [email or web address]    CDC: What to Do If You're Sick: www.cdc.gov/coronavirus/2019-ncov/about/steps-when-sick.html    CDC: Ending Home Isolation: www.cdc.gov/coronavirus/2019-ncov/hcp/disposition-in-home-patients.html     CDC: Caring for Someone: www.cdc.gov/coronavirus/2019-ncov/if-you-are-sick/care-for-someone.html     [facility]: Interim Guidance for Hospital Discharge to Home: www.health.[facility].[place]/diseases/coronavirus/hcp/hospdischarge.pdf    [facility] clinical trials (COVID-19 research studies):  clinicalaffairs.[facility]-clinical-trials     Below are the COVID-19 hotlines at the [place] Department of Health ([facility]). Interpreters are available.   o For health questions: Call 669-154-7308 or 1-584.173.3813 (7 a.m. to 7 p.m.)  o For questions about schools and childcare: Call 253-551-5326 or 1-769.168.3242 (7 a.m. to 7 p.m.)     Cassie Braswell RN Nursing Advisor 7/5/2022 7:01 PM     Reason for Disposition    [1] COVID-19 diagnosed by positive lab test (e.g., PCR, rapid self-test kit) AND [2] mild symptoms (e.g., cough, fever, others) AND [3] no complications or SOB    Additional Information    Negative: SEVERE difficulty breathing (e.g., struggling for each breath, speaks in single words)    Negative: Difficult to awaken or acting confused (e.g., disoriented, slurred speech)    Negative: Bluish (or gray) lips or face now    Negative: Shock suspected (e.g., cold/pale/clammy skin, too weak to stand, low BP, rapid pulse)    Negative: Sounds like a life-threatening emergency to the triager    Negative: [1] Diagnosed or suspected COVID-19 AND [2] symptoms lasting 3 or more weeks    Negative: [1] COVID-19 exposure AND [2] no symptoms    Negative: COVID-19 vaccine reaction suspected (e.g., fever, headache, muscle aches) occurring 1 to 3 days after getting vaccine    Negative: COVID-19 vaccine, questions about    Negative: [1] Lives with someone known to have influenza (flu test positive) AND [2] flu-like symptoms (e.g., cough, runny nose, sore throat, SOB; with or without fever)    Negative: [1] Adult with possible COVID-19 symptoms AND [2] triager concerned about severity of symptoms or other causes    Negative: COVID-19 and breastfeeding, questions about    Negative: SEVERE or constant chest pain or pressure  (Exception: Mild central chest pain, present only when coughing.)    Negative: MODERATE difficulty breathing (e.g., speaks in phrases, SOB even at rest, pulse 100-120)    Negative: [1] Headache AND [2] stiff  neck (can't touch chin to chest)    Negative: Oxygen level (e.g., pulse oximetry) 90 percent or lower    Negative: Chest pain or pressure    Negative: Patient sounds very sick or weak to the triager    Negative: MILD difficulty breathing (e.g., minimal/no SOB at rest, SOB with walking, pulse <100)    Negative: Fever > 103 F (39.4 C)    Negative: [1] Fever > 101 F (38.3 C) AND [2] age > 60 years    Negative: [1] Fever > 100.0 F (37.8 C) AND [2] bedridden (e.g., nursing home patient, CVA, chronic illness, recovering from surgery)    Negative: HIGH RISK for severe COVID complications (e.g., weak immune system, age > 64 years, obesity with BMI > 25, pregnant, chronic lung disease or other chronic medical condition)  (Exception: Already seen by PCP and no new or worsening symptoms.)    Negative: [1] HIGH RISK patient AND [2] influenza is widespread in the community AND [3] ONE OR MORE respiratory symptoms: cough, sore throat, runny or stuffy nose    Negative: [1] HIGH RISK patient AND [2] influenza exposure within the last 7 days AND [3] ONE OR MORE respiratory symptoms: cough, sore throat, runny or stuffy nose    Negative: Oxygen level (e.g., pulse oximetry) 91 to 94 percent    Negative: Fever present > 3 days (72 hours)    Negative: [1] Fever returns after gone for over 24 hours AND [2] symptoms worse or not improved    Negative: [1] Continuous (nonstop) coughing interferes with work or school AND [2] no improvement using cough treatment per Care Advice    Negative: [1] COVID-19 infection suspected by caller or triager AND [2] mild symptoms (cough, fever, or others) AND [3] negative COVID-19 rapid test    Negative: Cough present > 3 weeks    Negative: [1] COVID-19 diagnosed by positive lab test (e.g., PCR, rapid self-test kit) AND [2] NO symptoms (e.g., cough, fever, others)    Protocols used: CORONAVIRUS (COVID-19) DIAGNOSED OR [id number] 1.18.2022

## 2022-07-06 ENCOUNTER — NURSE TRIAGE (OUTPATIENT)
Dept: NURSING | Facility: CLINIC | Age: 32
End: 2022-07-06

## 2022-07-07 NOTE — TELEPHONE ENCOUNTER
Patient nad family all testing positive for Covid; requesting revieiw of isoltion/ quarantine and genral   Care instructions   Triage protocol reviewed   Advised to test anyone symptomatic;   Observe and minerva for red flag symptoms as discussed   Understands and will comply   Ny Lion RN  FNA       Reason for Disposition    [1] COVID-19 diagnosed by positive lab test (e.g., PCR, rapid self-test kit) AND [2] mild symptoms (e.g., cough, fever, others) AND [3] no complications or SOB    Additional Information    Negative: SEVERE difficulty breathing (e.g., struggling for each breath, speaks in single words)    Negative: Difficult to awaken or acting confused (e.g., disoriented, slurred speech)    Negative: Bluish (or gray) lips or face now    Negative: Shock suspected (e.g., cold/pale/clammy skin, too weak to stand, low BP, rapid pulse)    Negative: Sounds like a life-threatening emergency to the triager    Negative: [1] Diagnosed or suspected COVID-19 AND [2] symptoms lasting 3 or more weeks    Negative: [1] COVID-19 exposure AND [2] no symptoms    Negative: COVID-19 vaccine reaction suspected (e.g., fever, headache, muscle aches) occurring 1 to 3 days after getting vaccine    Negative: COVID-19 vaccine, questions about    Negative: [1] Lives with someone known to have influenza (flu test positive) AND [2] flu-like symptoms (e.g., cough, runny nose, sore throat, SOB; with or without fever)    Negative: [1] Adult with possible COVID-19 symptoms AND [2] triager concerned about severity of symptoms or other causes    Negative: COVID-19 and breastfeeding, questions about    Negative: SEVERE or constant chest pain or pressure  (Exception: Mild central chest pain, present only when coughing.)    Negative: MODERATE difficulty breathing (e.g., speaks in phrases, SOB even at rest, pulse 100-120)    Negative: [1] Headache AND [2] stiff neck (can't touch chin to chest)    Negative: Oxygen level (e.g., pulse oximetry) 90  percent or lower    Negative: Chest pain or pressure    Negative: Patient sounds very sick or weak to the triager    Negative: MILD difficulty breathing (e.g., minimal/no SOB at rest, SOB with walking, pulse <100)    Negative: Fever > 103 F (39.4 C)    Negative: [1] Fever > 101 F (38.3 C) AND [2] age > 60 years    Negative: [1] Fever > 100.0 F (37.8 C) AND [2] bedridden (e.g., nursing home patient, CVA, chronic illness, recovering from surgery)    Negative: HIGH RISK for severe COVID complications (e.g., weak immune system, age > 64 years, obesity with BMI > 25, pregnant, chronic lung disease or other chronic medical condition)  (Exception: Already seen by PCP and no new or worsening symptoms.)    Negative: [1] HIGH RISK patient AND [2] influenza is widespread in the community AND [3] ONE OR MORE respiratory symptoms: cough, sore throat, runny or stuffy nose    Negative: [1] HIGH RISK patient AND [2] influenza exposure within the last 7 days AND [3] ONE OR MORE respiratory symptoms: cough, sore throat, runny or stuffy nose    Negative: Oxygen level (e.g., pulse oximetry) 91 to 94 percent    Negative: Fever present > 3 days (72 hours)    Negative: [1] Fever returns after gone for over 24 hours AND [2] symptoms worse or not improved    Negative: [1] Continuous (nonstop) coughing interferes with work or school AND [2] no improvement using cough treatment per Care Advice    Negative: [1] COVID-19 infection suspected by caller or triager AND [2] mild symptoms (cough, fever, or others) AND [3] negative COVID-19 rapid test    Negative: Cough present > 3 weeks    Negative: [1] COVID-19 diagnosed by positive lab test (e.g., PCR, rapid self-test kit) AND [2] NO symptoms (e.g., cough, fever, others)    Protocols used: CORONAVIRUS (COVID-19) DIAGNOSED OR [id number] 1.18.2022

## 2022-10-16 ENCOUNTER — HEALTH MAINTENANCE LETTER (OUTPATIENT)
Age: 32
End: 2022-10-16

## 2022-11-01 ENCOUNTER — TELEPHONE (OUTPATIENT)
Dept: FAMILY MEDICINE | Facility: OTHER | Age: 32
End: 2022-11-01

## 2022-11-01 NOTE — TELEPHONE ENCOUNTER
Attempt # 1  Outcome: Left Message   Comment: LM for patient with appointment date and time, I asked that patient call me back directly if this time and date did not work.      Next 5 appointments (look out 90 days)      Nov 04, 2022  8:40 AM  (Arrive by 8:25 AM)  SHORT with Dodie Hebert NP  [facility] [place] ([facility] [place] ) [address]  [place] [address]  [phone]

## 2022-11-01 NOTE — TELEPHONE ENCOUNTER
9:02 AM    Reason for Call: OVERBOOK    Patient is having the following symptoms: INCREASED DEPRESSION for COUPLE weeks.    The patient is requesting an appointment for ASAP with JAVID PAUL.    Was an appointment offered for this call? Yes  If yes : Appointment type              Date 12/05/2022- TOO FAR OUT     Preferred method for responding to this message: Telephone Call  What is your phone number ? [phone]    If we cannot reach you directly, may we leave a detailed response at the number you provided? Yes    Can this message wait until your PCP/provider returns, if unavailable today? Not applicable, PROVIDER IS IN TODAY     Jacklyn Maldonado

## 2022-11-02 NOTE — PROGRESS NOTES
Assessment & Plan     Anxiety  Current moderate episode of major depressive disorder without prior episode (H)  Patient with new onset anxiety and depression. Depression worse than anxiety. Will check a CBC, Vit D, and TSH today. Will notify patient of the results when available and intervene accordingly. Since he has little energy and motivation, will start Wellbutrin 150 mg and see him back in 4 weeks. He will also start exercising daily and consuming a healthy diet.     Encouraged discussion with trusted relative or friend to monitor for negative mood changes and change in behaviour during medication initiation and titration. Recommended immediate help if increased thoughts of suicide and call if significant side effects occur. Encouraged patient that this type of medication is not effective immediately, and to be consistant with taking the medication.      - Vitamin D Deficiency; Future  - TSH with free T4 reflex; Future  - CBC with platelets and differential; Future  - buPROPion (WELLBUTRIN XL) 150 MG 24 hr tablet; Take 1 tablet (150 mg) by mouth every morning    Tobacco abuse  Cessation encouraged.         Return in about 4 weeks (around 12/2/2022).    Dodie Hebert NP  [facility] Gallegos is a 32 year old, presenting for the following health issues:  Depression      HPI     Abnormal Mood Symptoms  Onset/Duration: 3-4 months  Description: Down all the time, no motivation to do anything he likes, feels anxious and depressed  Depression (if yes, do PHQ-9): YES  Anxiety (if yes, do JONAH-7): YES  Accompanying Signs & Symptoms:  Still participating in activities that you used to enjoy: No  Fatigue: YES  Irritability: YES  Difficulty concentrating: YES  Changes in appetite: YES  Problems with sleep: YES- sometimes  Heart racing/beating fast: No  Abnormally elevated, expansive, or irritable mood: YES  Persistently increased activity or energy: No  Thoughts of hurting yourself or  "others: No  History:  Recent stress or major life event: YES- multiple; daughter clingy with mother-this upsets him; wife withdrawn and irritable  Prior depression or anxiety: None  Family history of depression or anxiety: YES- mother and sister  Alcohol/drug use: YES- not often  Difficulty sleeping: YES- sometimes (always had off and on)  Precipitating or alleviating factors: None  Therapies tried and outcome: lifestyle changes and TRY to do do things to cheer him up, uneffective.  -Stopped exercising; admits to feeling better when he was exercising  -Was eating healthier in the past.     Mother and sister with depression.     He recently started smoking 1 pack every 3 weeks.     PHQ 9/3/2020 2/3/2021 11/4/2022   PHQ-9 Total Score 3 1 16   Q9: Thoughts of better off dead/self-harm past 2 weeks Not at all Not at all Not at all     JONAH-7 SCORE 9/3/2020 2/3/2021 11/4/2022   Total Score 2 1 11     Due for several vaccines. Willing to get the influenza. Declines all other vaccines.      Review of Systems   Constitutional, HEENT, cardiovascular, pulmonary, gi and gu systems are negative, except as otherwise noted.      Objective    /86 (BP Location: Right arm, Patient Position: Sitting, Cuff Size: Adult Large)   Pulse 68   Temp 97  F (36.1  C) (Tympanic)   Ht 1.892 m (6' 2.5\")   Wt 104.6 kg (230 lb 8 oz)   SpO2 98%   BMI 29.20 kg/m    Body mass index is 29.2 kg/m .  Physical Exam   GENERAL: healthy, alert and no distress, affect falt  EYES: Eyes grossly normal to inspection, PERRL and conjunctivae and sclerae normal  HENT: ear canals and TM's normal, nose and mouth without ulcers or lesions  NECK: no adenopathy, no asymmetry, masses, or scars and thyroid normal to palpation  RESP: lungs clear to auscultation - no rales, rhonchi or wheezes  CV: regular rate and rhythm, no murmur, click or rub, no peripheral edema  NEURO: Normal strength and tone, mentation intact and speech normal  PSYCH: mentation appears " normal, affect flat    Labs pending

## 2022-11-04 ENCOUNTER — OFFICE VISIT (OUTPATIENT)
Dept: FAMILY MEDICINE | Facility: OTHER | Age: 32
End: 2022-11-04
Attending: NURSE PRACTITIONER
Payer: COMMERCIAL

## 2022-11-04 VITALS
HEART RATE: 68 BPM | OXYGEN SATURATION: 98 % | WEIGHT: 230.5 LBS | HEIGHT: 75 IN | SYSTOLIC BLOOD PRESSURE: 124 MMHG | TEMPERATURE: 97 F | BODY MASS INDEX: 28.66 KG/M2 | DIASTOLIC BLOOD PRESSURE: 86 MMHG

## 2022-11-04 DIAGNOSIS — Z72.0 TOBACCO ABUSE: ICD-10-CM

## 2022-11-04 DIAGNOSIS — F41.9 ANXIETY: ICD-10-CM

## 2022-11-04 DIAGNOSIS — F32.1 CURRENT MODERATE EPISODE OF MAJOR DEPRESSIVE DISORDER WITHOUT PRIOR EPISODE (H): ICD-10-CM

## 2022-11-04 DIAGNOSIS — Z23 NEED FOR INFLUENZA VACCINATION: Primary | ICD-10-CM

## 2022-11-04 LAB
BASOPHILS # BLD AUTO: 0 10E3/UL (ref 0–0.2)
BASOPHILS NFR BLD AUTO: 0 %
EOSINOPHIL # BLD AUTO: 0.1 10E3/UL (ref 0–0.7)
EOSINOPHIL NFR BLD AUTO: 2 %
ERYTHROCYTE [DISTWIDTH] IN BLOOD BY AUTOMATED COUNT: 12.1 % (ref 10–15)
HCT VFR BLD AUTO: 44.1 % (ref 40–53)
HGB BLD-MCNC: 15.2 G/DL (ref 13.3–17.7)
IMM GRANULOCYTES # BLD: 0 10E3/UL
IMM GRANULOCYTES NFR BLD: 0 %
LYMPHOCYTES # BLD AUTO: 1.2 10E3/UL (ref 0.8–5.3)
LYMPHOCYTES NFR BLD AUTO: 25 %
MCH RBC QN AUTO: 30 PG (ref 26.5–33)
MCHC RBC AUTO-ENTMCNC: 34.5 G/DL (ref 31.5–36.5)
MCV RBC AUTO: 87 FL (ref 78–100)
MONOCYTES # BLD AUTO: 0.3 10E3/UL (ref 0–1.3)
MONOCYTES NFR BLD AUTO: 7 %
NEUTROPHILS # BLD AUTO: 3 10E3/UL (ref 1.6–8.3)
NEUTROPHILS NFR BLD AUTO: 66 %
NRBC # BLD AUTO: 0 10E3/UL
NRBC BLD AUTO-RTO: 0 /100
PLATELET # BLD AUTO: 184 10E3/UL (ref 150–450)
RBC # BLD AUTO: 5.06 10E6/UL (ref 4.4–5.9)
TSH SERPL DL<=0.005 MIU/L-ACNC: 2.06 UIU/ML (ref 0.3–4.2)
WBC # BLD AUTO: 4.7 10E3/UL (ref 4–11)

## 2022-11-04 PROCEDURE — 85025 COMPLETE CBC W/AUTO DIFF WBC: CPT | Performed by: NURSE PRACTITIONER

## 2022-11-04 PROCEDURE — 36415 COLL VENOUS BLD VENIPUNCTURE: CPT | Performed by: NURSE PRACTITIONER

## 2022-11-04 PROCEDURE — 90686 IIV4 VACC NO PRSV 0.5 ML IM: CPT | Performed by: NURSE PRACTITIONER

## 2022-11-04 PROCEDURE — 99214 OFFICE O/P EST MOD 30 MIN: CPT | Mod: 25 | Performed by: NURSE PRACTITIONER

## 2022-11-04 PROCEDURE — 84443 ASSAY THYROID STIM HORMONE: CPT | Performed by: NURSE PRACTITIONER

## 2022-11-04 PROCEDURE — 82306 VITAMIN D 25 HYDROXY: CPT | Performed by: NURSE PRACTITIONER

## 2022-11-04 PROCEDURE — 90471 IMMUNIZATION ADMIN: CPT | Performed by: NURSE PRACTITIONER

## 2022-11-04 RX ORDER — BUPROPION HYDROCHLORIDE 150 MG/1
150 TABLET ORAL EVERY MORNING
Qty: 30 TABLET | Refills: 1 | Status: SHIPPED | OUTPATIENT
Start: 2022-11-04 | End: 2022-11-30

## 2022-11-04 ASSESSMENT — ANXIETY QUESTIONNAIRES
GAD7 TOTAL SCORE: 11
5. BEING SO RESTLESS THAT IT IS HARD TO SIT STILL: SEVERAL DAYS
2. NOT BEING ABLE TO STOP OR CONTROL WORRYING: SEVERAL DAYS
1. FEELING NERVOUS, ANXIOUS, OR ON EDGE: MORE THAN HALF THE DAYS
3. WORRYING TOO MUCH ABOUT DIFFERENT THINGS: MORE THAN HALF THE DAYS
6. BECOMING EASILY ANNOYED OR IRRITABLE: MORE THAN HALF THE DAYS
IF YOU CHECKED OFF ANY PROBLEMS ON THIS QUESTIONNAIRE, HOW DIFFICULT HAVE THESE PROBLEMS MADE IT FOR YOU TO DO YOUR WORK, TAKE CARE OF THINGS AT HOME, OR GET ALONG WITH OTHER PEOPLE: SOMEWHAT DIFFICULT
7. FEELING AFRAID AS IF SOMETHING AWFUL MIGHT HAPPEN: SEVERAL DAYS
4. TROUBLE RELAXING: MORE THAN HALF THE DAYS
GAD7 TOTAL SCORE: 11

## 2022-11-04 ASSESSMENT — PATIENT HEALTH QUESTIONNAIRE - PHQ9: SUM OF ALL RESPONSES TO PHQ QUESTIONS 1-9: 16

## 2022-11-04 ASSESSMENT — PAIN SCALES - GENERAL: PAINLEVEL: NO PAIN (0)

## 2022-11-04 NOTE — NURSING NOTE
"Chief Complaint   Patient presents with     Depression       Initial /86 (BP Location: Right arm, Patient Position: Sitting, Cuff Size: Adult Large)   Pulse 68   Temp 97  F (36.1  C) (Tympanic)   Ht 1.892 m (6' 2.5\")   Wt 104.6 kg (230 lb 8 oz)   SpO2 98%   BMI 29.20 kg/m   Estimated body mass index is 29.2 kg/m  as calculated from the following:    Height as of this encounter: 1.892 m (6' 2.5\").    Weight as of this encounter: 104.6 kg (230 lb 8 oz).  Medication Reconciliation: complete  Rachael Bond LPN  "

## 2022-11-07 LAB — DEPRECATED CALCIDIOL+CALCIFEROL SERPL-MC: 18 UG/L (ref 20–75)

## 2022-11-30 DIAGNOSIS — F32.1 CURRENT MODERATE EPISODE OF MAJOR DEPRESSIVE DISORDER WITHOUT PRIOR EPISODE (H): ICD-10-CM

## 2022-11-30 DIAGNOSIS — F41.9 ANXIETY: ICD-10-CM

## 2022-11-30 RX ORDER — BUPROPION HYDROCHLORIDE 150 MG/1
150 TABLET ORAL EVERY MORNING
Qty: 30 TABLET | Refills: 1 | Status: SHIPPED | OUTPATIENT
Start: 2022-11-30 | End: 2022-12-07

## 2022-11-30 NOTE — TELEPHONE ENCOUNTER
buPropion      Last Written Prescription Date:  11/4/22  Last Fill Quantity: 30,   # refills: 1  Last Office Visit: 11/4/22  Future Office visit:    Next 5 appointments (look out 90 days)    Dec 07, 2022  9:20 AM  (Arrive by 9:05 AM)  SHORT with Dodie Hebert NP  [facility] [place] ([facility] [place] ) [address]  [place] [address]  [phone]           Routing refill request to provider for review/approval because:

## 2022-12-05 NOTE — PROGRESS NOTES
"  Assessment & Plan     Current moderate episode of major depressive disorder without prior episode (H)  Anxiety  Mental health has improved. No side effects from Wellbutrin. Will continue and reassess in 6 months. Sooner with new or worsening symptoms.     - buPROPion (WELLBUTRIN XL) 150 MG 24 hr tablet; Take 1 tablet (150 mg) by mouth every morning    Vitamin D deficiency  - Vitamin D Deficiency; Future  -Will notify patient of the results when available and intervene accordingly.   956}     BMI:   Estimated body mass index is 27.36 kg/m  as calculated from the following:    Height as of this encounter: 1.892 m (6' 2.5\").    Weight as of this encounter: 98 kg (216 lb).       Dodie Hebert NP  [facility] - BISHOP Gallegos is a 32 year old, presenting for the following health issues:  Anxiety and Depression      HPI     Depression and Anxiety Follow-Up    Last seen on 22. Wellbutrin was started. Today he notes that he is feeling better. His wife has also noticed that he is feeling better. Energy levels have improved.       How are you doing with your depression since your last visit? Improved     How are you doing with your anxiety since your last visit?  No change    Are you having other symptoms that might be associated with depression or anxiety? No    Have you had a significant life event? No     Do you have any concerns with your use of alcohol or other drugs? No     No thoughts of self harm.     Recent TSH normal.     Vit D was 18 on 22. He is taking 4000 units daily. Will recheck his levels today.     Social History     Tobacco Use     Smoking status: Some Days     Packs/day: 1.00     Years: 10.00     Pack years: 10.00     Types: Pipe, Cigarettes     Start date: 2006     Last attempt to quit: 2020     Years since quittin.9     Smokeless tobacco: Never   Substance Use Topics     Alcohol use: Yes     Comment: drinks once a week     Drug use: No     PHQ " "2/3/2021 11/4/2022 12/7/2022   PHQ-9 Total Score 1 16 0   Q9: Thoughts of better off dead/self-harm past 2 weeks Not at all Not at all Not at all     JONAH-7 SCORE 2/3/2021 11/4/2022 12/7/2022   Total Score 1 11 1     Last PHQ-9 12/7/2022   1.  Little interest or pleasure in doing things 0   2.  Feeling down, depressed, or hopeless 0   3.  Trouble falling or staying asleep, or sleeping too much 0   4.  Feeling tired or having little energy 0   5.  Poor appetite or overeating 0   6.  Feeling bad about yourself 0   7.  Trouble concentrating 0   8.  Moving slowly or restless 0   Q9: Thoughts of better off dead/self-harm past 2 weeks 0   PHQ-9 Total Score 0   Difficulty at work, home, or with people -     JONAH-7  12/7/2022   1. Feeling nervous, anxious, or on edge 0   2. Not being able to stop or control worrying 0   3. Worrying too much about different things 0   4. Trouble relaxing 0   5. Being so restless that it is hard to sit still 0   6. Becoming easily annoyed or irritable 1   7. Feeling afraid, as if something awful might happen 0   JONAH-7 Total Score 1   If you checked any problems, how difficult have they made it for you to do your work, take care of things at home, or get along with other people? Not difficult at all           Review of Systems   Constitutional, HEENT, cardiovascular, pulmonary, gi and gu systems are negative, except as otherwise noted.      Objective    /83 (BP Location: Right arm, Patient Position: Sitting, Cuff Size: Adult Large)   Pulse 89   Temp 97.5  F (36.4  C) (Tympanic)   Ht 1.892 m (6' 2.5\")   Wt 98 kg (216 lb)   SpO2 96%   BMI 27.36 kg/m    Body mass index is 27.36 kg/m .  Physical Exam   GENERAL: healthy, alert and no distress  NEURO: Normal strength and tone, mentation intact and speech normal  PSYCH: mentation appears normal, affect normal/bright    Vit D pending              "

## 2022-12-07 ENCOUNTER — OFFICE VISIT (OUTPATIENT)
Dept: FAMILY MEDICINE | Facility: OTHER | Age: 32
End: 2022-12-07
Attending: NURSE PRACTITIONER
Payer: COMMERCIAL

## 2022-12-07 VITALS
DIASTOLIC BLOOD PRESSURE: 83 MMHG | BODY MASS INDEX: 26.86 KG/M2 | OXYGEN SATURATION: 96 % | WEIGHT: 216 LBS | TEMPERATURE: 97.5 F | HEART RATE: 89 BPM | SYSTOLIC BLOOD PRESSURE: 120 MMHG | HEIGHT: 75 IN

## 2022-12-07 DIAGNOSIS — E55.9 VITAMIN D DEFICIENCY: ICD-10-CM

## 2022-12-07 DIAGNOSIS — F41.9 ANXIETY: ICD-10-CM

## 2022-12-07 DIAGNOSIS — F32.1 CURRENT MODERATE EPISODE OF MAJOR DEPRESSIVE DISORDER WITHOUT PRIOR EPISODE (H): Primary | ICD-10-CM

## 2022-12-07 PROCEDURE — 82306 VITAMIN D 25 HYDROXY: CPT | Performed by: NURSE PRACTITIONER

## 2022-12-07 PROCEDURE — 36415 COLL VENOUS BLD VENIPUNCTURE: CPT | Performed by: NURSE PRACTITIONER

## 2022-12-07 PROCEDURE — 99213 OFFICE O/P EST LOW 20 MIN: CPT | Performed by: NURSE PRACTITIONER

## 2022-12-07 RX ORDER — BUPROPION HYDROCHLORIDE 150 MG/1
150 TABLET ORAL EVERY MORNING
Qty: 90 TABLET | Refills: 1 | Status: SHIPPED | OUTPATIENT
Start: 2022-12-07 | End: 2023-06-14

## 2022-12-07 ASSESSMENT — ANXIETY QUESTIONNAIRES
GAD7 TOTAL SCORE: 1
1. FEELING NERVOUS, ANXIOUS, OR ON EDGE: NOT AT ALL
4. TROUBLE RELAXING: NOT AT ALL
GAD7 TOTAL SCORE: 1
2. NOT BEING ABLE TO STOP OR CONTROL WORRYING: NOT AT ALL
IF YOU CHECKED OFF ANY PROBLEMS ON THIS QUESTIONNAIRE, HOW DIFFICULT HAVE THESE PROBLEMS MADE IT FOR YOU TO DO YOUR WORK, TAKE CARE OF THINGS AT HOME, OR GET ALONG WITH OTHER PEOPLE: NOT DIFFICULT AT ALL
5. BEING SO RESTLESS THAT IT IS HARD TO SIT STILL: NOT AT ALL
7. FEELING AFRAID AS IF SOMETHING AWFUL MIGHT HAPPEN: NOT AT ALL
6. BECOMING EASILY ANNOYED OR IRRITABLE: SEVERAL DAYS
3. WORRYING TOO MUCH ABOUT DIFFERENT THINGS: NOT AT ALL

## 2022-12-07 ASSESSMENT — PAIN SCALES - GENERAL: PAINLEVEL: NO PAIN (0)

## 2022-12-07 ASSESSMENT — PATIENT HEALTH QUESTIONNAIRE - PHQ9: SUM OF ALL RESPONSES TO PHQ QUESTIONS 1-9: 0

## 2022-12-08 LAB — DEPRECATED CALCIDIOL+CALCIFEROL SERPL-MC: 25 UG/L (ref 20–75)

## 2023-04-13 ENCOUNTER — HOSPITAL ENCOUNTER (EMERGENCY)
Facility: HOSPITAL | Age: 33
Discharge: HOME | End: 2023-04-13
Attending: NURSE PRACTITIONER | Admitting: NURSE PRACTITIONER
Payer: COMMERCIAL

## 2023-04-13 VITALS
TEMPERATURE: 98.1 F | SYSTOLIC BLOOD PRESSURE: 136 MMHG | HEART RATE: 101 BPM | DIASTOLIC BLOOD PRESSURE: 87 MMHG | RESPIRATION RATE: 16 BRPM | OXYGEN SATURATION: 96 %

## 2023-04-13 DIAGNOSIS — T15.92XA FOREIGN BODY IN EYE, LEFT, INITIAL ENCOUNTER: Primary | ICD-10-CM

## 2023-04-13 PROCEDURE — 99213 OFFICE O/P EST LOW 20 MIN: CPT | Performed by: NURSE PRACTITIONER

## 2023-04-13 PROCEDURE — 250N000009 HC RX 250: Performed by: NURSE PRACTITIONER

## 2023-04-13 PROCEDURE — G0463 HOSPITAL OUTPT CLINIC VISIT: HCPCS

## 2023-04-13 RX ORDER — OFLOXACIN 3 MG/ML
1-2 SOLUTION/ DROPS OPHTHALMIC 4 TIMES DAILY
Qty: 5 ML | Refills: 0 | Status: SHIPPED | OUTPATIENT
Start: 2023-04-13 | End: 2023-04-20

## 2023-04-13 RX ORDER — TETRACAINE HYDROCHLORIDE 5 MG/ML
1-2 SOLUTION OPHTHALMIC ONCE
Status: COMPLETED | OUTPATIENT
Start: 2023-04-13 | End: 2023-04-13

## 2023-04-13 RX ADMIN — TETRACAINE HYDROCHLORIDE 2 DROP: 5 SOLUTION OPHTHALMIC at 16:39

## 2023-04-13 ASSESSMENT — ENCOUNTER SYMPTOMS: EYE PAIN: 1

## 2023-04-13 ASSESSMENT — VISUAL ACUITY: OU: 1

## 2023-04-13 NOTE — DISCHARGE INSTRUCTIONS
Apply the eyedrops to affected eye as prescribed.  Tylenol or ibuprofen as needed for pain.    Schedule an appointment with an eye doctor for evaluation of your eye.    Return to urgent care or emergency room for any worsening or concerning symptoms.

## 2023-04-13 NOTE — ED TRIAGE NOTES
Patient presents to urgent care after getting a piece of metel in his left eye. States it happened about 2 hours ago. Eye is red and painful. States he tried flushing his eye and no relief. States when he flushed his eyes he got most of it out and but there is one piece in there still that feels stuck. Pain 6/10. States driving was hard to do.   Visual acuity with corrective lenses.   Right- 20/40  Left 20/30

## 2023-04-13 NOTE — ED TRIAGE NOTES
Patient presents after getting a piece of metal in left eye that happened about 1-2 hrs ago. Patient eye red and painful. Reports flushing eye with no relief.

## 2023-04-13 NOTE — ED PROVIDER NOTES
History     Chief Complaint   Patient presents with     Foreign Body in Eye     HPI  Roberto Meza is a 32 year old male who presents to urgent care with concerns of a foreign body to his left eye.  The patient tells me that he was grinding some metal when some wind blew while he was grinding towards his face.  He was not wearing any safety glasses.  He was wearing his eyeglasses.  He has had pain and irritation to his left eye ever since.  He does feel most discomfort due to the outer left upper eyelid.  He flushed his eye for about 5 to 10 minutes with minimal effectiveness.  Vision is slightly blurred.  No history of eye surgeries.    Allergies:  Allergies   Allergen Reactions     Bee Venom      Per patient is undiagnosed.     Bees        Problem List:    Patient Active Problem List    Diagnosis Date Noted     Gastric reflux 2018     Priority: Medium        Past Medical History:    No past medical history on file.    Past Surgical History:    Past Surgical History:   Procedure Laterality Date     growth removal[      left leg     HERNIA REPAIR  2019    Chely Ray       Family History:    Family History   Problem Relation Age of Onset     Colon Cancer No family hx of        Social History:  Marital Status:   [2]  Social History     Tobacco Use     Smoking status: Some Days     Packs/day: 1.00     Years: 10.00     Pack years: 10.00     Types: Pipe, Cigarettes     Start date: 2006     Last attempt to quit: 2020     Years since quittin.2     Smokeless tobacco: Never   Substance Use Topics     Alcohol use: Yes     Comment: drinks once a week     Drug use: No        Medications:    buPROPion (WELLBUTRIN XL) 150 MG 24 hr tablet  ofloxacin (OCUFLOX) 0.3 % ophthalmic solution          Review of Systems   Eyes: Positive for pain and visual disturbance.   All other systems reviewed and are negative.      Physical Exam   BP: 136/87  Pulse: 101  Temp: 98.1  F (36.7  C)  Resp: 16  SpO2:  96 %      Physical Exam  Vitals and nursing note reviewed.   Constitutional:       General: He is not in acute distress.     Appearance: Normal appearance. He is well-developed. He is not diaphoretic.   HENT:      Head: Normocephalic and atraumatic.   Eyes:      General: Vision grossly intact. No visual field deficit.        Left eye: Foreign body present.No hordeolum.      Extraocular Movements: Extraocular movements intact.      Conjunctiva/sclera:      Left eye: No chemosis or exudate.     Pupils: Pupils are equal, round, and reactive to light.      Left eye: Pupil is round, reactive and not sluggish. No corneal abrasion or fluorescein uptake.      Funduscopic exam:        Left eye: No hemorrhage.   Cardiovascular:      Rate and Rhythm: Normal rate.   Pulmonary:      Effort: Pulmonary effort is normal.   Musculoskeletal:      Cervical back: Normal range of motion and neck supple.   Skin:     General: Skin is warm and dry.      Coloration: Skin is not pale.   Neurological:      Mental Status: He is alert and oriented to person, place, and time.         ED Course              [facility]    Foreign Body Removal - Ocular    Date/Time: 4/13/2023 5:20 PM    Performed by: Alice Torres CNP  Authorized by: Alice Torres CNP    Risks, benefits and alternatives discussed.      LOCATION     Location:  L conjunctival    Depth:  Superficial    PRE PROCEDURE DETAILS:     Imaging:  None    OS visual acuity:  20/40    OD visual acuity:  20/30    Correction: corrected      Fluorescein exam: yes      Fluorescein uptake: no      ANESTHESIA (see MAR for exact dosages):     Local anesthetic:  Tetracaine drops    PROCEDURE DETAILS     Localization method:  Direct visualization    Removal mechanism:  Moist cotton swab and irrigation    Foreign bodies recovered:  5 or more    Description:  Small metal shavings    Intact foreign body removal: yes      Residual rust ring observed: no      POST PROCEDURE DETAILS      Confirmation:  No additional foreign bodies on visualization      PROCEDURE    Patient Tolerance:  Patient tolerated the procedure well with no immediate complications   A moist cotton swab was used to to remove the metal specks into cornea.  When evaluating left upper eyelid and wiping with a moist cotton swab the patient report that he had significant discomfort to the middle of his left upper eyelid.  No obvious foreign body was appreciated to this area but a small amount of blood was noted when wiping this area.  No further seeping of this area was completed.  Irrigation was completed with additional metal specks removed from the eye.  No further foreign bodies were identified.  Patient tolerated procedure well.           No results found for this or any previous visit (from the past 24 hour(s)).    Medications   tetracaine (PONTOCAINE) 0.5 % ophthalmic solution 1-2 drop (2 drops Left Eye $Given 4/13/23 7813)       Assessments & Plan (with Medical Decision Making)     I have reviewed the nursing notes.    32-year-old male with foreign bodies to his left eye from when he was shaving some metal in the some wind that blew metal shavings towards his face.  On evaluation there was several foreign bodies that were identified to his left cornea.  A moist cotton swab along with irrigation was utilized to remove this from his eye.  He did have some significant discomfort to his left upper eyelid and towards the middle along with a small amount of blood noted when wiping over this area.  Unable to appreciate a foreign body to this area.  In no further interventions was done to this area due to the small amount of blood that was being noted when wiping with a cotton swab.  Patient tolerated procedure well.  Patient will be treated with ofloxacin eyedrops due to foreign bodies that were in his eye.  Discussed with patient that they possibly could be a retained foreign body to his left upper eyelid and he was therefore  advised to closely follow-up with an eye doctor.  He notes that he does have an eye doctor that he just saw earlier this month.  Patient was advised to return to urgent care or emergency department for any worsening or concerning symptoms.    I have reviewed the findings, diagnosis, plan and need for follow up with the patient.  This document was prepared using a combination of typing and voice generated software.  While every attempt was made for accuracy, spelling and grammatical errors may exist.    Medical Decision Making  The patient's presentation was of moderate complexity (an acute complicated injury).    The patient's evaluation involved:  history and exam without other MDM data elements    The patient's management necessitated moderate risk (prescription drug management including medications given in the ED).        Discharge Medication List as of 4/13/2023  4:57 PM      START taking these medications    Details   ofloxacin (OCUFLOX) 0.3 % ophthalmic solution Place 1-2 drops Into the left eye 4 times daily for 7 days, Disp-5 mL, R-0, E-Prescribe             Final diagnoses:   Foreign body in eye, left, initial encounter       4/13/2023   [facility]     Mpofu, Erinnnce, CNP  04/13/23 1168

## 2023-04-18 ENCOUNTER — TELEPHONE (OUTPATIENT)
Dept: FAMILY MEDICINE | Facility: OTHER | Age: 33
End: 2023-04-18

## 2023-04-18 NOTE — TELEPHONE ENCOUNTER
Patient informed that should be seen by opthalmology but to keep appointment for today since the pain has worsened.

## 2023-06-07 NOTE — PROGRESS NOTES
"  Assessment & Plan     Current moderate episode of major depressive disorder without prior episode (H)  Anxiety  Feeling much better with Wellbutrin. Will continue. Will see him back in 6 months, sooner with new or worsening symptoms.     - buPROPion (WELLBUTRIN XL) 150 MG 24 hr tablet; Take 1 tablet (150 mg) by mouth every morning  }     Nicotine/Tobacco Cessation:  He reports that he has been smoking pipe and cigarettes. He started smoking about 17 years ago. He has a 10.00 pack-year smoking history. He has never used smokeless tobacco.  Nicotine/Tobacco Cessation Plan:   Information offered: Patient not interested at this time      BMI:   Estimated body mass index is 27.34 kg/m  as calculated from the following:    Height as of 12/7/22: 1.892 m (6' 2.5\").    Weight as of this encounter: 97.9 kg (215 lb 12.8 oz).       Dodie Hebert NP  [facility] - BISHOP Gallegos is a 32 year old, presenting for the following health issues:  Anxiety and Depression      HPI     Depression and Anxiety Follow-Up    How are you doing with your depression since your last visit? Improved     How are you doing with your anxiety since your last visit?  No change    Are you having other symptoms that might be associated with depression or anxiety? Yes:  Ro and irratible, sleeping habits    Have you had a significant life event? No     Do you have any concerns with your use of alcohol or other drugs? No     No thoughts of self harm.     Taking Wellbutrin 150 mg without side effects. Feels this is really helping.     H/O Vit D deficiency. Was taking 2000 units daily, but stopped since he is now outside more. Declines to recheck today.    Due for the pneumococcal vaccine. Declines. Smokes occasionally.     Social History     Tobacco Use     Smoking status: Some Days     Packs/day: 1.00     Years: 10.00     Pack years: 10.00     Types: Pipe, Cigarettes     Start date: 1/1/2006     Last attempt to quit: " 2020     Years since quittin.4     Smokeless tobacco: Never   Substance Use Topics     Alcohol use: Yes     Comment: drinks once a week     Drug use: No         2022     8:00 AM 2022     9:06 AM 2023     9:11 AM   PHQ   PHQ-9 Total Score 16 0 1   Q9: Thoughts of better off dead/self-harm past 2 weeks Not at all Not at all Not at all         2022     8:00 AM 2022     9:00 AM 2023     9:12 AM   JONAH-7 SCORE   Total Score   1 (minimal anxiety)   Total Score 11 1 1         2023     9:11 AM   Last PHQ-9   1.  Little interest or pleasure in doing things 0   2.  Feeling down, depressed, or hopeless 0   3.  Trouble falling or staying asleep, or sleeping too much 0   4.  Feeling tired or having little energy 1   5.  Poor appetite or overeating 0   6.  Feeling bad about yourself 0   7.  Trouble concentrating 0   8.  Moving slowly or restless 0   Q9: Thoughts of better off dead/self-harm past 2 weeks 0   PHQ-9 Total Score 1         2023     9:12 AM   JONAH-7    1. Feeling nervous, anxious, or on edge 0   2. Not being able to stop or control worrying 0   3. Worrying too much about different things 0   4. Trouble relaxing 0   5. Being so restless that it is hard to sit still 0   6. Becoming easily annoyed or irritable 1   7. Feeling afraid, as if something awful might happen 0   JONAH-7 Total Score 1   If you checked any problems, how difficult have they made it for you to do your work, take care of things at home, or get along with other people? Not difficult at all       6}Answers for HPI/ROS submitted by the patient on 2023  If you checked off any problems, how difficult have these problems made it for you to do your work, take care of things at home, or get along with other people?: Not difficult at all  PHQ9 TOTAL SCORE: 1        Review of Systems   Constitutional, HEENT, cardiovascular, pulmonary, gi and gu systems are negative, except as otherwise noted.      Objective     /72   Pulse 82   Temp 97.1  F (36.2  C) (Tympanic)   Resp 17   Wt 97.9 kg (215 lb 12.8 oz)   SpO2 94%   BMI 27.34 kg/m    Body mass index is 27.34 kg/m .  Physical Exam   GENERAL: healthy, alert and no distress  EYES: Eyes grossly normal to inspection, PERRL and conjunctivae and sclerae normal  HENT: ear canals and TM's normal, nose and mouth without ulcers or lesions  RESP: lungs clear to auscultation - no rales, rhonchi or wheezes  CV: regular rate and rhythm, normal S1 S2, no S3 or S4, no murmur, click or rub, no peripheral edema and peripheral pulses strong  PSYCH: mentation appears normal, affect normal/bright

## 2023-06-14 ENCOUNTER — OFFICE VISIT (OUTPATIENT)
Dept: FAMILY MEDICINE | Facility: OTHER | Age: 33
End: 2023-06-14
Attending: NURSE PRACTITIONER
Payer: COMMERCIAL

## 2023-06-14 VITALS
BODY MASS INDEX: 27.34 KG/M2 | DIASTOLIC BLOOD PRESSURE: 72 MMHG | SYSTOLIC BLOOD PRESSURE: 104 MMHG | OXYGEN SATURATION: 94 % | TEMPERATURE: 97.1 F | WEIGHT: 215.8 LBS | RESPIRATION RATE: 17 BRPM | HEART RATE: 82 BPM

## 2023-06-14 DIAGNOSIS — F32.1 CURRENT MODERATE EPISODE OF MAJOR DEPRESSIVE DISORDER WITHOUT PRIOR EPISODE (H): Primary | ICD-10-CM

## 2023-06-14 DIAGNOSIS — F41.9 ANXIETY: ICD-10-CM

## 2023-06-14 PROCEDURE — 99213 OFFICE O/P EST LOW 20 MIN: CPT | Performed by: NURSE PRACTITIONER

## 2023-06-14 RX ORDER — BUPROPION HYDROCHLORIDE 150 MG/1
150 TABLET ORAL EVERY MORNING
Qty: 90 TABLET | Refills: 1 | Status: SHIPPED | OUTPATIENT
Start: 2023-06-14 | End: 2024-01-03

## 2023-06-14 ASSESSMENT — ANXIETY QUESTIONNAIRES
3. WORRYING TOO MUCH ABOUT DIFFERENT THINGS: NOT AT ALL
1. FEELING NERVOUS, ANXIOUS, OR ON EDGE: NOT AT ALL
GAD7 TOTAL SCORE: 1
7. FEELING AFRAID AS IF SOMETHING AWFUL MIGHT HAPPEN: NOT AT ALL
4. TROUBLE RELAXING: NOT AT ALL
5. BEING SO RESTLESS THAT IT IS HARD TO SIT STILL: NOT AT ALL
2. NOT BEING ABLE TO STOP OR CONTROL WORRYING: NOT AT ALL
8. IF YOU CHECKED OFF ANY PROBLEMS, HOW DIFFICULT HAVE THESE MADE IT FOR YOU TO DO YOUR WORK, TAKE CARE OF THINGS AT HOME, OR GET ALONG WITH OTHER PEOPLE?: NOT DIFFICULT AT ALL
GAD7 TOTAL SCORE: 1
GAD7 TOTAL SCORE: 1
IF YOU CHECKED OFF ANY PROBLEMS ON THIS QUESTIONNAIRE, HOW DIFFICULT HAVE THESE PROBLEMS MADE IT FOR YOU TO DO YOUR WORK, TAKE CARE OF THINGS AT HOME, OR GET ALONG WITH OTHER PEOPLE: NOT DIFFICULT AT ALL
7. FEELING AFRAID AS IF SOMETHING AWFUL MIGHT HAPPEN: NOT AT ALL
6. BECOMING EASILY ANNOYED OR IRRITABLE: SEVERAL DAYS

## 2023-06-14 ASSESSMENT — PATIENT HEALTH QUESTIONNAIRE - PHQ9
SUM OF ALL RESPONSES TO PHQ QUESTIONS 1-9: 1
SUM OF ALL RESPONSES TO PHQ QUESTIONS 1-9: 1
10. IF YOU CHECKED OFF ANY PROBLEMS, HOW DIFFICULT HAVE THESE PROBLEMS MADE IT FOR YOU TO DO YOUR WORK, TAKE CARE OF THINGS AT HOME, OR GET ALONG WITH OTHER PEOPLE: NOT DIFFICULT AT ALL

## 2023-06-14 ASSESSMENT — PAIN SCALES - GENERAL: PAINLEVEL: MODERATE PAIN (4)

## 2023-06-14 NOTE — LETTER
My Depression Action Plan  Name: Roberto Meza   Date of Birth 1990  Date: 6/7/2023    My doctor: Dodie Hebert   My clinic: [facility] [place]  [place] [address]  [phone]            GREEN    ZONE   Good Control    What it looks like:     Things are going generally well. You have normal ups and downs. You may even feel depressed from time to time, but bad moods usually last less than a day.   What you need to do:  1. Continue to care for yourself (see self care plan)  2. Check your depression survival kit and update it as needed  3. Follow your physician s recommendations including any medication.  4. Do not stop taking medication unless you consult with your physician first.             YELLOW         ZONE Getting Worse    What it looks like:     Depression is starting to interfere with your life.     It may be hard to get out of bed; you may be starting to isolate yourself from others.    Symptoms of depression are starting to last most all day and this has happened for several days.     You may have suicidal thoughts but they are not constant.   What you need to do:     1. Call your care team. Your response to treatment will improve if you keep your care team informed of your progress. Yellow periods are signs an adjustment may need to be made.     2. Continue your self-care.  Just get dressed and ready for the day.  Don't give yourself time to talk yourself out of it.    3. Talk to someone in your support network.    4. Open up your Depression Self-Care Plan/Wellness Kit.             RED    ZONE Medical Alert - Get Help    What it looks like:     Depression is seriously interfering with your life.     You may experience these or other symptoms: You can t get out of bed most days, can t work or engage in other necessary activities, you have trouble taking care of basic hygiene, or basic responsibilities, thoughts of suicide or death that will not go away,  self-injurious behavior.     What you need to do:  1. Call your care team and request a same-day appointment. If they are not available (weekends or after hours) call your local crisis line, emergency room or 911.          Depression Self-Care Plan / Wellness Kit    Many people find that medication and therapy are helpful treatments for managing depression. In addition, making small changes to your everyday life can help to boost your mood and improve your wellbeing. Below are some tips for you to consider. Be sure to talk with your medical provider and/or behavioral health consultant if your symptoms are worsening or not improving.     Sleep   Sleep hygiene  means all of the habits that support good, restful sleep. It includes maintaining a consistent bedtime and wake time, using your bedroom only for sleeping or sex, and keeping the bedroom dark and free of distractions like a computer, smartphone, or television.     Develop a Healthy Routine  Maintain good hygiene. Get out of bed in the morning, make your bed, brush your teeth, take a shower, and get dressed. Don t spend too much time viewing media that makes you feel stressed. Find time to relax each day.    Exercise  Get some form of exercise every day. This will help reduce pain and release endorphins, the  feel good  chemicals in your brain. It can be as simple as just going for a walk or doing some gardening, anything that will get you moving.      Diet  Strive to eat healthy foods, including fruits and vegetables. Drink plenty of water. Avoid excessive sugar, caffeine, alcohol, and other mood-altering substances.     Stay Connected with Others  Stay in touch with friends and family members.    Manage Your Mood  Try deep breathing, massage therapy, biofeedback, or meditation. Take part in fun activities when you can. Try to find something to smile about each day.     Psychotherapy  Be open to working with a therapist if your provider recommends it.      Medication  Be sure to take your medication as prescribed. Most anti-depressants need to be taken every day. It usually takes several weeks for medications to work. Not all medicines work for all people. It is important to follow-up with your provider to make sure you have a treatment plan that is working for you. Do not stop your medication abruptly without first discussing it with your provider.    Crisis Resources   These hotlines are for both adults and children. They and are open 24 hours a day, 7 days a week unless noted otherwise.      National Suicide Prevention Lifeline   988 or 5-729-735-CRSY (2082)      Crisis Text Line    www.crisistextline.org  Text HOME to 768924 from anywhere in the [place], anytime, about any type of crisis. A live, trained crisis counselor will receive the text and respond quickly.      [facility] Lif[facility] for LGBTQ Youth  A national crisis intervention and suicide lifeline for LGBTQ youth under 25. Provides a safe place to talk without judgement. Call 1-176.192.8818; text START to 180056 or visit www.thetrevorproject.org to talk to a trained counselor.      For [facility] crisis numbers, visit the [facility] website at:  https://[place].gov/dhs/people-we-serve/adults/health-care/mental-health/resources/crisis-contacts.jsp

## 2023-08-26 ENCOUNTER — HEALTH MAINTENANCE LETTER (OUTPATIENT)
Age: 33
End: 2023-08-26

## 2023-08-29 ENCOUNTER — MYC MEDICAL ADVICE (OUTPATIENT)
Dept: FAMILY MEDICINE | Facility: OTHER | Age: 33
End: 2023-08-29

## 2023-09-15 ENCOUNTER — APPOINTMENT (OUTPATIENT)
Dept: GENERAL RADIOLOGY | Facility: HOSPITAL | Age: 33
End: 2023-09-15
Attending: EMERGENCY MEDICINE
Payer: COMMERCIAL

## 2023-09-15 ENCOUNTER — HOSPITAL ENCOUNTER (EMERGENCY)
Facility: HOSPITAL | Age: 33
Discharge: HOME | End: 2023-09-15
Attending: EMERGENCY MEDICINE | Admitting: EMERGENCY MEDICINE
Payer: COMMERCIAL

## 2023-09-15 ENCOUNTER — TELEPHONE (OUTPATIENT)
Dept: FAMILY MEDICINE | Facility: OTHER | Age: 33
End: 2023-09-15

## 2023-09-15 ENCOUNTER — OFFICE VISIT (OUTPATIENT)
Dept: FAMILY MEDICINE | Facility: OTHER | Age: 33
End: 2023-09-15
Attending: STUDENT IN AN ORGANIZED HEALTH CARE EDUCATION/TRAINING PROGRAM
Payer: COMMERCIAL

## 2023-09-15 VITALS
TEMPERATURE: 97 F | WEIGHT: 227.38 LBS | DIASTOLIC BLOOD PRESSURE: 76 MMHG | HEART RATE: 83 BPM | SYSTOLIC BLOOD PRESSURE: 124 MMHG | BODY MASS INDEX: 28.8 KG/M2 | OXYGEN SATURATION: 96 %

## 2023-09-15 VITALS
RESPIRATION RATE: 16 BRPM | SYSTOLIC BLOOD PRESSURE: 132 MMHG | OXYGEN SATURATION: 96 % | TEMPERATURE: 98.8 F | HEART RATE: 83 BPM | DIASTOLIC BLOOD PRESSURE: 68 MMHG | BODY MASS INDEX: 28.45 KG/M2 | WEIGHT: 224.6 LBS

## 2023-09-15 DIAGNOSIS — R07.9 CHEST PAIN, UNSPECIFIED TYPE: ICD-10-CM

## 2023-09-15 DIAGNOSIS — L03.039 PARONYCHIA OF GREAT TOE: Primary | ICD-10-CM

## 2023-09-15 DIAGNOSIS — L60.0 INGROWN NAIL OF GREAT TOE: ICD-10-CM

## 2023-09-15 LAB
ALBUMIN SERPL BCG-MCNC: 4.8 G/DL (ref 3.5–5.2)
ALP SERPL-CCNC: 53 U/L (ref 40–129)
ALT SERPL W P-5'-P-CCNC: 40 U/L (ref 0–70)
ANION GAP SERPL CALCULATED.3IONS-SCNC: 12 MMOL/L (ref 7–15)
AST SERPL W P-5'-P-CCNC: 38 U/L (ref 0–45)
BASOPHILS # BLD AUTO: 0 10E3/UL (ref 0–0.2)
BASOPHILS NFR BLD AUTO: 0 %
BILIRUB SERPL-MCNC: 0.7 MG/DL
BUN SERPL-MCNC: 15.1 MG/DL (ref 6–20)
CALCIUM SERPL-MCNC: 9.3 MG/DL (ref 8.6–10)
CHLORIDE SERPL-SCNC: 100 MMOL/L (ref 98–107)
CREAT SERPL-MCNC: 1.37 MG/DL (ref 0.67–1.17)
DEPRECATED HCO3 PLAS-SCNC: 26 MMOL/L (ref 22–29)
EGFRCR SERPLBLD CKD-EPI 2021: 69 ML/MIN/1.73M2
EOSINOPHIL # BLD AUTO: 0.1 10E3/UL (ref 0–0.7)
EOSINOPHIL NFR BLD AUTO: 2 %
ERYTHROCYTE [DISTWIDTH] IN BLOOD BY AUTOMATED COUNT: 12.1 % (ref 10–15)
GLUCOSE SERPL-MCNC: 81 MG/DL (ref 70–99)
HCT VFR BLD AUTO: 42.5 % (ref 40–53)
HGB BLD-MCNC: 14.5 G/DL (ref 13.3–17.7)
HOLD SPECIMEN: NORMAL
IMM GRANULOCYTES # BLD: 0 10E3/UL
IMM GRANULOCYTES NFR BLD: 0 %
INR PPP: 0.96 (ref 0.85–1.15)
LACTATE SERPL-SCNC: 1.1 MMOL/L (ref 0.7–2)
LIPASE SERPL-CCNC: 16 U/L (ref 13–60)
LYMPHOCYTES # BLD AUTO: 1.5 10E3/UL (ref 0.8–5.3)
LYMPHOCYTES NFR BLD AUTO: 28 %
MCH RBC QN AUTO: 30.3 PG (ref 26.5–33)
MCHC RBC AUTO-ENTMCNC: 34.1 G/DL (ref 31.5–36.5)
MCV RBC AUTO: 89 FL (ref 78–100)
MONOCYTES # BLD AUTO: 0.4 10E3/UL (ref 0–1.3)
MONOCYTES NFR BLD AUTO: 8 %
NEUTROPHILS # BLD AUTO: 3.2 10E3/UL (ref 1.6–8.3)
NEUTROPHILS NFR BLD AUTO: 62 %
NRBC # BLD AUTO: 0 10E3/UL
NRBC BLD AUTO-RTO: 0 /100
PLATELET # BLD AUTO: 185 10E3/UL (ref 150–450)
POTASSIUM SERPL-SCNC: 4 MMOL/L (ref 3.4–5.3)
PROT SERPL-MCNC: 7 G/DL (ref 6.4–8.3)
RBC # BLD AUTO: 4.78 10E6/UL (ref 4.4–5.9)
SODIUM SERPL-SCNC: 138 MMOL/L (ref 136–145)
TROPONIN T SERPL HS-MCNC: <6 NG/L
WBC # BLD AUTO: 5.3 10E3/UL (ref 4–11)

## 2023-09-15 PROCEDURE — 99284 EMERGENCY DEPT VISIT MOD MDM: CPT | Performed by: EMERGENCY MEDICINE

## 2023-09-15 PROCEDURE — 85610 PROTHROMBIN TIME: CPT | Performed by: EMERGENCY MEDICINE

## 2023-09-15 PROCEDURE — 93005 ELECTROCARDIOGRAM TRACING: CPT

## 2023-09-15 PROCEDURE — 84484 ASSAY OF TROPONIN QUANT: CPT | Performed by: EMERGENCY MEDICINE

## 2023-09-15 PROCEDURE — 83690 ASSAY OF LIPASE: CPT | Performed by: EMERGENCY MEDICINE

## 2023-09-15 PROCEDURE — 83605 ASSAY OF LACTIC ACID: CPT | Performed by: EMERGENCY MEDICINE

## 2023-09-15 PROCEDURE — 85025 COMPLETE CBC W/AUTO DIFF WBC: CPT | Performed by: EMERGENCY MEDICINE

## 2023-09-15 PROCEDURE — 99285 EMERGENCY DEPT VISIT HI MDM: CPT | Mod: 25

## 2023-09-15 PROCEDURE — 71045 X-RAY EXAM CHEST 1 VIEW: CPT

## 2023-09-15 PROCEDURE — 80053 COMPREHEN METABOLIC PANEL: CPT | Performed by: EMERGENCY MEDICINE

## 2023-09-15 PROCEDURE — 36415 COLL VENOUS BLD VENIPUNCTURE: CPT | Performed by: EMERGENCY MEDICINE

## 2023-09-15 PROCEDURE — 99213 OFFICE O/P EST LOW 20 MIN: CPT | Performed by: STUDENT IN AN ORGANIZED HEALTH CARE EDUCATION/TRAINING PROGRAM

## 2023-09-15 PROCEDURE — 93010 ELECTROCARDIOGRAM REPORT: CPT | Performed by: INTERNAL MEDICINE

## 2023-09-15 RX ORDER — CEPHALEXIN 500 MG/1
500 CAPSULE ORAL 4 TIMES DAILY
Qty: 28 CAPSULE | Refills: 0 | Status: SHIPPED | OUTPATIENT
Start: 2023-09-15 | End: 2023-09-22

## 2023-09-15 RX ORDER — MUPIROCIN 20 MG/G
OINTMENT TOPICAL 3 TIMES DAILY
Qty: 30 G | Refills: 0 | Status: SHIPPED | OUTPATIENT
Start: 2023-09-15 | End: 2024-01-03

## 2023-09-15 ASSESSMENT — ACTIVITIES OF DAILY LIVING (ADL): ADLS_ACUITY_SCORE: 35

## 2023-09-15 ASSESSMENT — PAIN SCALES - GENERAL: PAINLEVEL: MODERATE PAIN (5)

## 2023-09-15 NOTE — PATIENT INSTRUCTIONS
Soak foot/toe 2-3 times daily in warm water with epsom salt for 15 minutes.   Apply the mupirocin ointment after (no neosporin).   Try to push skin away from nail   Cover with bandaid.   Take oral antibiotics for 7 days.   Podiatry referral - they will call - for toenail removal.     While on antibiotics, it's recommended you take probiotics, eat more yogurt or drink Kefir, to promote good bacteria in your gut. Doing this for 2 weeks after finishing the antibiotics is also recommended to minimize GI side effects, like diarrhea. For probiotics, ones that have >1,000,000 colony forming units (CFUs) are good to take.

## 2023-09-15 NOTE — Clinical Note
Hi Dr. Lea, I'm hoping you can see El next week for a toe nail removal - I saw him today and am treating paronychia, secondary to the ingrown nail. Thank you!

## 2023-09-15 NOTE — ED TRIAGE NOTES
Pt presents with c/o a warm achy feeling across his chest, shoulders feel heavy, and left arm numbness and tingling that began at 1730 today.

## 2023-09-15 NOTE — PROGRESS NOTES
Assessment & Plan     Paronychia of great toe  Paronychia with likely abscess that has drained, as none now on exam.   Recommend soaking foot at least twice daily and warm water with Epsom salt, applying topical mupirocin after, and taking oral antibiotics for 5 to 7 days.  Should keep the area covered in between foot soaks.  Concerning signs and symptoms reviewed that would indicate need for urgent re-evaluation. Patient stated understanding and agreement with the plan.   - cephALEXin (KEFLEX) 500 MG capsule; Take 1 capsule (500 mg) by mouth 4 times daily for 7 days  - mupirocin (BACTROBAN) 2 % external ointment; Apply topically 3 times daily Apply to affected area 3 times daily.    Ingrown nail of great toe  Needs removal of left great toenail.  Placed urgent referral to our podiatrist here.  Will also route her the chart in hopes that she can see him next week to remove the toenail to prevent recurrent issue.  - Orthopedic  Referral; Future        Alley Eugene MD  [facility] - BISHOP Gallegos is a 33 year old, presenting for the following health issues:  Ingrown Toenail        9/15/2023     2:44 PM   Additional Questions   Roomed by Marina Villegas   Accompanied by None         9/15/2023     2:44 PM   Patient Reported Additional Medications   Patient reports taking the following new medications None       HPI     Concern - Ingrown toenail of left great toe  Onset: 9/14/2023  Description: redness, swelling of left great toe   Intensity: moderate  Progression of Symptoms:  same  Accompanying Signs & Symptoms: Redness, swelling. painful  Previous history of similar problem: Yes years ago  Precipitating factors:        Worsened by: walking, activity  Alleviating factors:        Improved by: None  Therapies tried and outcome: Soaked in epsom salt last night     No fevers or chills.   No spreading redness.   Has been painful and red around the nail, some drainage.   Soaked  last night         Objective    /76 (BP Location: Right arm, Patient Position: Sitting, Cuff Size: Adult Large)   Pulse 83   Temp 97  F (36.1  C) (Tympanic)   Wt 103.1 kg (227 lb 6 oz)   SpO2 96%   BMI 28.80 kg/m    Body mass index is 28.8 kg/m .    Physical Exam  Constitutional:       General: He is not in acute distress.     Appearance: Normal appearance. He is not ill-appearing.   Skin:     General: Skin is warm and dry.      Capillary Refill: Capillary refill takes less than 2 seconds.      Comments: Left great toe with tenderness over medial nail bed, slight drainage when squeezed onto the nail, with no abscess. Erythema present with inflammation.   Foot is warm and well perfused.    Neurological:      Mental Status: He is alert.

## 2023-09-15 NOTE — TELEPHONE ENCOUNTER
8:48 AM    Reason for Call: OVERBOOK    Patient is having the following symptoms: Patient needs to be seen for ingrown toenail infected,swollen,oozing, starting to be very painful. days.    The patient is requesting an appointment for Overbook with Dodie Hebert.    Was an appointment offered for this call? No  If yes : Appointment type              Date   out of office    Preferred method for responding to this message: Telephone Call  What is your phone number ?  [phone]    If we cannot reach you directly, may we leave a detailed response at the number you provided? Yes    Can this message wait until your PCP/provider returns, if unavailable today? Dodie Hebert is out willing to see anyone    Liberty Bell   yes

## 2023-09-16 LAB
ATRIAL RATE - MUSE: 72 BPM
DIASTOLIC BLOOD PRESSURE - MUSE: NORMAL MMHG
INTERPRETATION ECG - MUSE: NORMAL
P AXIS - MUSE: 52 DEGREES
PR INTERVAL - MUSE: 142 MS
QRS DURATION - MUSE: 88 MS
QT - MUSE: 388 MS
QTC - MUSE: 424 MS
R AXIS - MUSE: 10 DEGREES
SYSTOLIC BLOOD PRESSURE - MUSE: NORMAL MMHG
T AXIS - MUSE: 50 DEGREES
VENTRICULAR RATE- MUSE: 72 BPM

## 2023-09-16 NOTE — ED NOTES
"Pt was Rxed abx from a UC today due to ingrown toenail. Pt was told to come in  if he felt \"funny\" after taking them. He took abx at 1500 and he states he felt burning feeling in chest, and bilateral arm tinging and aching. Pt states aching is still present, denies current tingling. Pt denies chest pain, pressure, SOB, or nausea. Pt does not have any reddness or hives. Pt is A/Ox4 and ambulatory.   "

## 2023-09-18 ENCOUNTER — OFFICE VISIT (OUTPATIENT)
Dept: PODIATRY | Facility: OTHER | Age: 33
End: 2023-09-18
Attending: PODIATRIST
Payer: COMMERCIAL

## 2023-09-18 VITALS
DIASTOLIC BLOOD PRESSURE: 82 MMHG | OXYGEN SATURATION: 94 % | TEMPERATURE: 97.6 F | SYSTOLIC BLOOD PRESSURE: 125 MMHG | HEART RATE: 89 BPM

## 2023-09-18 DIAGNOSIS — L60.0 INGROWN TOENAIL: Primary | ICD-10-CM

## 2023-09-18 DIAGNOSIS — M79.675 GREAT TOE PAIN, LEFT: ICD-10-CM

## 2023-09-18 PROCEDURE — 11750 EXCISION NAIL&NAIL MATRIX: CPT | Mod: TA | Performed by: PODIATRIST

## 2023-09-18 ASSESSMENT — ENCOUNTER SYMPTOMS
FEVER: 0
SHORTNESS OF BREATH: 0
CHILLS: 0

## 2023-09-18 ASSESSMENT — PAIN SCALES - GENERAL: PAINLEVEL: MILD PAIN (2)

## 2023-09-18 NOTE — PATIENT INSTRUCTIONS
Nail procedure care:  -Start epsom salt soaks tomorrow. Soak the foot 1-2 times a day for 20 minutes.  -Apply an antibiotic cream, gauze and a bandaid over the toe for the first week after the procedure.  -After one week, continue the epsom salt soaks, but switch to a betadine dressing. Apply a small amount of betadine on gauze or dab the betadine over the toe with gauze and apply another dry gauze over the toe followed by a band aid or tape.  -Do not apply a band aid directly over the nail procedure site without gauze or it will trap too much moisture beneath the band aid.  Note: Continue the epsom salt soaks and dressings every day until the wound is fully healed. You should not see drainage on the bandage for at least two days in a row. Call the clinic if the wound is still moderately draining two weeks after the procedure.    Keep the toe covered at all times until it is completely healed.  -You may develop a black scab over the nail bed--let this fall off on its own and don't pick at it.  -The toe may drain for 2-3 weeks. It is normal for it to have a clear drainage.    Watching for signs of infection:  If the toe has a thick, white pus coming from the procedure site or if the the toe becomes red, swollen, painful, or you begin to feel sick (fever/chills/nausea/vomitting), return to the podiatry clinic immediately or to the Emergency Department room if after hours.      Podiatry can be reached directly at [phone]. Leave a voicemail if there is not an answer.

## 2023-09-18 NOTE — ED PROVIDER NOTES
History     Chief Complaint   Patient presents with    Chest Pain     HPI  Roberto Meza is a 33 year old male who is here with chest pain and tingling to both arms after taking antibiotics for ingrown toenail.  No history of similar in the past.  No chest pain at this time.    Allergies:  Allergies   Allergen Reactions    Bee Venom      Per patient is undiagnosed.    Bees        Problem List:    Patient Active Problem List    Diagnosis Date Noted    Current moderate episode of major depressive disorder without prior episode (H) 2023     Priority: Medium    Anxiety 2023     Priority: Medium    Gastric reflux 2018     Priority: Medium        Past Medical History:    No past medical history on file.    Past Surgical History:    Past Surgical History:   Procedure Laterality Date    growth removal[      left leg    HERNIA REPAIR  2019    [facility]       Family History:    Family History   Problem Relation Age of Onset    Colon Cancer No family hx of        Social History:  Marital Status:   [2]  Social History     Tobacco Use    Smoking status: Some Days     Packs/day: 0.10     Years: 10.00     Pack years: 1.00     Types: Pipe, Cigarettes     Start date: 2006     Last attempt to quit: 2020     Years since quittin.7    Smokeless tobacco: Never   Vaping Use    Vaping Use: Never used   Substance Use Topics    Alcohol use: Yes     Comment: drinks once a week    Drug use: No        Medications:    buPROPion (WELLBUTRIN XL) 150 MG 24 hr tablet  cephALEXin (KEFLEX) 500 MG capsule  mupirocin (BACTROBAN) 2 % external ointment          Review of Systems   Constitutional:  Negative for chills and fever.   Respiratory:  Negative for shortness of breath.    Cardiovascular:  Positive for chest pain.   All other systems reviewed and are negative.      Physical Exam   BP: 138/78  Pulse: 83  Temp: 98.8  F (37.1  C)  Resp: 16  Weight: 101.9 kg (224 lb 9.6 oz)  SpO2: 97 %      Physical  Exam  Constitutional:       General: He is not in acute distress.     Appearance: Normal appearance.   HENT:      Head: Normocephalic and atraumatic.      Right Ear: External ear normal.      Left Ear: External ear normal.      Nose: Nose normal. No rhinorrhea.   Eyes:      Conjunctiva/sclera: Conjunctivae normal.   Cardiovascular:      Rate and Rhythm: Normal rate and regular rhythm.      Pulses: Normal pulses.   Pulmonary:      Effort: Pulmonary effort is normal. No respiratory distress.      Breath sounds: Normal breath sounds.   Abdominal:      General: There is no distension.      Palpations: Abdomen is soft.      Tenderness: There is no abdominal tenderness.   Musculoskeletal:         General: No deformity or signs of injury.   Skin:     General: Skin is warm and dry.      Capillary Refill: Capillary refill takes less than 2 seconds.   Neurological:      General: No focal deficit present.      Mental Status: He is alert. Mental status is at baseline.   Psychiatric:         Mood and Affect: Mood normal.         Behavior: Behavior normal.         ED Course                 Procedures              EKG Interpretation:      Interpreted by Angel Jain MD  Time reviewed:   Symptoms at time of EKG:    Rhythm: normal sinus   Rate: normal  Axis: normal  Ectopy: none  Conduction: normal  ST Segments/ T Waves: No ST-T wave changes  Q Waves: none  Comparison to prior: No old EKG available    Clinical Impression: normal EKG      Critical Care time:  none               No results found for this or any previous visit (from the past 24 hour(s)).    Medications - No data to display    Assessments & Plan (with Medical Decision Making)     I have reviewed the nursing notes.    I have reviewed the findings, diagnosis, plan and need for follow up with the patient.          Medical Decision Making  The patient's presentation was of moderate complexity (an undiagnosed new problem with uncertain diagnosis).    The patient's  evaluation involved:  ordering and/or review of 3+ test(s) in this encounter (see separate area of note for details)    The patient's management necessitated only low risk treatment.    29-year-old male here with chest pain.  EKG and troponin unremarkable.  Low risk.  Discharge.    Discharge Medication List as of 9/15/2023  9:33 PM          Final diagnoses:   Chest pain, unspecified type       9/15/2023   [facility]       Angel Jain MD  09/18/23 0557

## 2023-09-18 NOTE — PROGRESS NOTES
Chief complaint: Patient presents with:  Ingrown Toenail: Left foot      History of Present Illness: This 33 year old male is seen at the request of No ref. provider found for evaluation and suggestions of management of a painful ingrown toenail on the LEFT hallux  medial toenail border.    He went to see his PCP on 09/15/2023. He was placed on Keflex 500mg four times daily and he applied Mupirocin and gauze over the toe over the weekend. He says it looks much better today. He would like to discuss treatment options for the ingrown toenail. He is also doing 2-3 epsom salt soaks per day. He has had previous ingrown toenails, but they have all resolved after soaks and trimming it back. This was his first ingrown toenail that did not improve.    No further pedal complaints today.       /82 (BP Location: Left arm, Patient Position: Sitting, Cuff Size: Adult Regular)   Pulse 89   Temp 97.6  F (36.4  C) (Tympanic)   SpO2 94%     Patient Active Problem List   Diagnosis    Gastric reflux    Current moderate episode of major depressive disorder without prior episode (H)    Anxiety       Past Surgical History:   Procedure Laterality Date    growth removal[  2001    left leg    HERNIA REPAIR  07/2019    [facility]       Current Outpatient Medications   Medication    buPROPion (WELLBUTRIN XL) 150 MG 24 hr tablet    cephALEXin (KEFLEX) 500 MG capsule    mupirocin (BACTROBAN) 2 % external ointment     No current facility-administered medications for this visit.          Allergies   Allergen Reactions    Bee Venom      Per patient is undiagnosed.    Bees        Family History   Problem Relation Age of Onset    Colon Cancer No family hx of        Social History     Socioeconomic History    Marital status:      Spouse name: None    Number of children: None    Years of education: None    Highest education level: None   Tobacco Use    Smoking status: Some Days     Packs/day: 0.10     Years: 10.00     Pack years: 1.00      Types: Pipe, Cigarettes     Start date: 2006     Last attempt to quit: 2020     Years since quittin.7    Smokeless tobacco: Never    Tobacco comments:     Pt denies quit plan 23   Vaping Use    Vaping Use: Never used   Substance and Sexual Activity    Alcohol use: Yes     Comment: drinks once a week    Drug use: No    Sexual activity: Yes     Partners: Female   Other Topics Concern    Parent/sibling w/ CABG, MI or angioplasty before 65F 55M? No   Social History Narrative    9/3/2020: , [employer], one daughter, lives in [place]       ROS: 10 point ROS neg other than the symptoms noted above in the HPI.  EXAM  Constitutional: healthy, alert, and no distress    Psychiatric: mentation appears normal and affect normal/bright    VASCULAR:  -Dorsalis pedis pulse +2/4 b/l  -Posterior tibial pulse +2/4 b/l  -Capillary refill time < 3 seconds to b/l hallux  -Hair growth Present to b/l anterior legs and ankles  NEURO:  -Light touch sensation intact to b/l plantar forefoot  DERM:  -Skin temperature, texture and turgor WNL b/l  -Incurvation to the medial border of the LEFT hallux  ---No  erythema and mild edema to the nail border  ---No open wounds an no drainage  ---No severe erythema, no ascending erythema, no calor, no purulence, no malodor, no other SOI.    MSK:  -Pain on palpation to LEFT hallux medial toenail border  -Muscle strength of ankles +5/5 for dorsiflexion, plantarflexion, ABDUction and ADDuction b/l  ============================================================    ASSESSMENT:  (L60.0) Ingrown toenail  (primary encounter diagnosis)    (M79.675) Great toe pain, left        PLAN:  -Patient evaluated and examined. Treatment options discussed with no educational barriers noted.  Ingrown toenail(s):  -Discussed nail procedure options and etiologies and treatments for ingrown toenails. Conservatively, patient could opt for a slant back today and keep monitoring the  toe since there are no SOI. Discussed risks and benefits and healing course of a nail border avulsion vs. Matrixectomy including post procedure infection or a non-healing wound, both of which could lead to a life threatening infection or amputation of the foot or leg or a proximal amputation. The antibiotics have cleared the infection but the ingrown toenail is still present. Patient is still advised to continue antibiotics until the prescription is out to reduce antibiotic resistance. Patient understands the risks and benefits and has decided to proceed with the following:    -Matrixectomy of left hallux Medial border: Written and verbal consent obtained after reviewing risks and benefits of the procedure. Patient understands that although phenol is used in attempt to prevent regrowth of the ingrown toenail, the nail can still grow back. There is also a risk of post procedure infection. A severe foot infection could lead to a proximal foot or leg amputation or loss of life, so the patient is advised to return to podiatry or the ED immediately if the patient notices any SOI. The patient is in agreement with this plan and wishes to proceed with the procedure. A time-out was performed to identify the correct patient, limb, digit and procedure.    An alcohol prep pad was applied to  to the base of the left hallux. The digit was injected with 5 mL of 1:1 of 2% Lidocaine plain and 0.5% marcaine plain in a ring block fashion at the base of the toe(s). Adequate local anesthesia was obtained. A ring tournicot was applied to the digit and a chloroprep was applied to the hallux. A freer was used to loosen the nail from the underlying nail bed. An English Anvil and a hemostat were then used to remove the medial toenail border. A total of three applications of phenol were applied for 30 seconds per application. The digit was rinsed thoroughly with alcohol. The tournicot was removed from the toe and there was a prompt hyperemic  "response to the hallux. The wound was then dressed with an Silvadene, gauze and 1\" coban. The patient was educated on after procedure care including daily epsom salt soaks starting tomorrow followed by dressing of the toe with an antibiotic cream and a bandaid until the wound site on the toe stops draining (2-3 weeks). Provided education on how to look for signs of infection (redness, swelling, pain, purulence, fever, chills, nausea, vomiting) and the patient was instructed to return to the clinic or Emergency Department immediately if there are any signs of infection.    -Patient in agreement with the above treatment plan and all of patient's questions were answered.      Return to clinic as needed        Carolyn Lea DPM  "

## 2023-11-25 NOTE — TELEPHONE ENCOUNTER
Called patient, informed note bleow.   aspirin 81 mg oral delayed release tablet: 1 tab(s) orally once a day  atorvastatin 80 mg oral tablet: 1 tab(s) orally once a day (at bedtime)  carvedilol 25 mg oral tablet: 1 tab(s) orally every 12 hours  clopidogrel 75 mg oral tablet: 1 tab(s) orally once a day  furosemide 40 mg oral tablet: 1 tab(s) orally 2 times a day  isosorbide mononitrate 60 mg oral tablet, extended release: 1 tab(s) orally once a day  sacubitril-valsartan 97 mg-103 mg oral tablet: 1 tab(s) orally 2 times a day

## 2023-12-20 ENCOUNTER — APPOINTMENT (OUTPATIENT)
Dept: OCCUPATIONAL MEDICINE | Facility: OTHER | Age: 33
End: 2023-12-20

## 2023-12-20 ENCOUNTER — APPOINTMENT (OUTPATIENT)
Dept: CHIROPRACTIC MEDICINE | Facility: OTHER | Age: 33
End: 2023-12-20

## 2023-12-20 PROCEDURE — 99499 UNLISTED E&M SERVICE: CPT | Performed by: CHIROPRACTOR

## 2024-01-01 PROBLEM — Z72.0 TOBACCO ABUSE: Status: ACTIVE | Noted: 2024-01-01

## 2024-01-03 ENCOUNTER — LAB (OUTPATIENT)
Dept: LAB | Facility: OTHER | Age: 34
End: 2024-01-03
Attending: NURSE PRACTITIONER
Payer: COMMERCIAL

## 2024-01-03 ENCOUNTER — OFFICE VISIT (OUTPATIENT)
Dept: FAMILY MEDICINE | Facility: OTHER | Age: 34
End: 2024-01-03
Attending: NURSE PRACTITIONER
Payer: COMMERCIAL

## 2024-01-03 VITALS
HEART RATE: 79 BPM | SYSTOLIC BLOOD PRESSURE: 116 MMHG | RESPIRATION RATE: 16 BRPM | BODY MASS INDEX: 30.45 KG/M2 | DIASTOLIC BLOOD PRESSURE: 84 MMHG | OXYGEN SATURATION: 96 % | WEIGHT: 244.9 LBS | HEIGHT: 75 IN | TEMPERATURE: 97.4 F

## 2024-01-03 DIAGNOSIS — F32.1 CURRENT MODERATE EPISODE OF MAJOR DEPRESSIVE DISORDER WITHOUT PRIOR EPISODE (H): ICD-10-CM

## 2024-01-03 DIAGNOSIS — Z13.220 LIPID SCREENING: ICD-10-CM

## 2024-01-03 DIAGNOSIS — E66.811 OBESITY, CLASS I, BMI 30-34.9: ICD-10-CM

## 2024-01-03 DIAGNOSIS — F41.9 ANXIETY: ICD-10-CM

## 2024-01-03 DIAGNOSIS — F41.9 ANXIETY: Primary | ICD-10-CM

## 2024-01-03 DIAGNOSIS — Z13.1 SCREENING FOR DIABETES MELLITUS: ICD-10-CM

## 2024-01-03 DIAGNOSIS — Z23 NEED FOR VACCINATION: ICD-10-CM

## 2024-01-03 DIAGNOSIS — R79.89 ELEVATED SERUM CREATININE: ICD-10-CM

## 2024-01-03 DIAGNOSIS — Z00.00 HEALTH MAINTENANCE EXAMINATION: Primary | ICD-10-CM

## 2024-01-03 PROBLEM — Z72.0 TOBACCO ABUSE: Status: RESOLVED | Noted: 2024-01-01 | Resolved: 2024-01-03

## 2024-01-03 LAB
ALBUMIN UR-MCNC: NEGATIVE MG/DL
ANION GAP SERPL CALCULATED.3IONS-SCNC: 11 MMOL/L (ref 7–15)
APPEARANCE UR: CLEAR
BILIRUB UR QL STRIP: NEGATIVE
BUN SERPL-MCNC: 18.3 MG/DL (ref 6–20)
CALCIUM SERPL-MCNC: 9.8 MG/DL (ref 8.6–10)
CHLORIDE SERPL-SCNC: 99 MMOL/L (ref 98–107)
CHOLEST SERPL-MCNC: 210 MG/DL
COLOR UR AUTO: ABNORMAL
CREAT SERPL-MCNC: 1.34 MG/DL (ref 0.67–1.17)
DEPRECATED HCO3 PLAS-SCNC: 28 MMOL/L (ref 22–29)
EGFRCR SERPLBLD CKD-EPI 2021: 71 ML/MIN/1.73M2
FASTING STATUS PATIENT QL REPORTED: NO
GLUCOSE SERPL-MCNC: 93 MG/DL (ref 70–99)
GLUCOSE UR STRIP-MCNC: NEGATIVE MG/DL
HDLC SERPL-MCNC: 32 MG/DL
HGB UR QL STRIP: NEGATIVE
KETONES UR STRIP-MCNC: NEGATIVE MG/DL
LDLC SERPL CALC-MCNC: 101 MG/DL
LEUKOCYTE ESTERASE UR QL STRIP: NEGATIVE
MAGNESIUM SERPL-MCNC: 2.2 MG/DL (ref 1.7–2.3)
MUCOUS THREADS #/AREA URNS LPF: PRESENT /LPF
NITRATE UR QL: NEGATIVE
NONHDLC SERPL-MCNC: 178 MG/DL
PH UR STRIP: 6.5 [PH] (ref 4.7–8)
POTASSIUM SERPL-SCNC: 4.1 MMOL/L (ref 3.4–5.3)
RBC URINE: <1 /HPF
SODIUM SERPL-SCNC: 138 MMOL/L (ref 135–145)
SP GR UR STRIP: 1.02 (ref 1–1.03)
SQUAMOUS EPITHELIAL: 0 /HPF
TRIGL SERPL-MCNC: 386 MG/DL
TSH SERPL DL<=0.005 MIU/L-ACNC: 2.02 UIU/ML (ref 0.3–4.2)
URATE SERPL-MCNC: 9.4 MG/DL (ref 3.4–7)
UROBILINOGEN UR STRIP-MCNC: NORMAL MG/DL
WBC URINE: <1 /HPF

## 2024-01-03 PROCEDURE — 81001 URINALYSIS AUTO W/SCOPE: CPT

## 2024-01-03 PROCEDURE — 84443 ASSAY THYROID STIM HORMONE: CPT | Performed by: NURSE PRACTITIONER

## 2024-01-03 PROCEDURE — 99395 PREV VISIT EST AGE 18-39: CPT | Mod: 25 | Performed by: NURSE PRACTITIONER

## 2024-01-03 PROCEDURE — 90677 PCV20 VACCINE IM: CPT | Performed by: NURSE PRACTITIONER

## 2024-01-03 PROCEDURE — 84550 ASSAY OF BLOOD/URIC ACID: CPT | Performed by: NURSE PRACTITIONER

## 2024-01-03 PROCEDURE — 82306 VITAMIN D 25 HYDROXY: CPT | Performed by: NURSE PRACTITIONER

## 2024-01-03 PROCEDURE — 83735 ASSAY OF MAGNESIUM: CPT | Performed by: NURSE PRACTITIONER

## 2024-01-03 PROCEDURE — 90715 TDAP VACCINE 7 YRS/> IM: CPT | Performed by: NURSE PRACTITIONER

## 2024-01-03 PROCEDURE — 83970 ASSAY OF PARATHORMONE: CPT | Performed by: NURSE PRACTITIONER

## 2024-01-03 PROCEDURE — 90686 IIV4 VACC NO PRSV 0.5 ML IM: CPT | Performed by: NURSE PRACTITIONER

## 2024-01-03 PROCEDURE — 90471 IMMUNIZATION ADMIN: CPT | Performed by: NURSE PRACTITIONER

## 2024-01-03 PROCEDURE — 80048 BASIC METABOLIC PNL TOTAL CA: CPT | Performed by: NURSE PRACTITIONER

## 2024-01-03 PROCEDURE — 36415 COLL VENOUS BLD VENIPUNCTURE: CPT | Performed by: NURSE PRACTITIONER

## 2024-01-03 PROCEDURE — 90746 HEPB VACCINE 3 DOSE ADULT IM: CPT | Performed by: NURSE PRACTITIONER

## 2024-01-03 PROCEDURE — 80061 LIPID PANEL: CPT | Performed by: NURSE PRACTITIONER

## 2024-01-03 PROCEDURE — 90472 IMMUNIZATION ADMIN EACH ADD: CPT | Performed by: NURSE PRACTITIONER

## 2024-01-03 RX ORDER — BUPROPION HYDROCHLORIDE 150 MG/1
150 TABLET ORAL EVERY MORNING
Qty: 90 TABLET | Refills: 1 | Status: SHIPPED | OUTPATIENT
Start: 2024-01-03 | End: 2024-01-03

## 2024-01-03 RX ORDER — BUPROPION HYDROCHLORIDE 150 MG/1
150 TABLET ORAL EVERY MORNING
Qty: 90 TABLET | Refills: 3 | Status: SHIPPED | OUTPATIENT
Start: 2024-01-03 | End: 2024-10-02

## 2024-01-03 ASSESSMENT — ENCOUNTER SYMPTOMS
PALPITATIONS: 0
NAUSEA: 0
WEAKNESS: 0
DYSURIA: 0
FEVER: 0
COUGH: 0
NERVOUS/ANXIOUS: 0
ABDOMINAL PAIN: 0
SORE THROAT: 0
CONSTIPATION: 0
HEMATOCHEZIA: 0
EYE PAIN: 0
JOINT SWELLING: 0
PARESTHESIAS: 0
FREQUENCY: 0
DIARRHEA: 0
HEARTBURN: 0
CHILLS: 0
HEADACHES: 0
MYALGIAS: 0
SHORTNESS OF BREATH: 0
HEMATURIA: 0
DIZZINESS: 0
ARTHRALGIAS: 0

## 2024-01-03 ASSESSMENT — ANXIETY QUESTIONNAIRES
7. FEELING AFRAID AS IF SOMETHING AWFUL MIGHT HAPPEN: NOT AT ALL
IF YOU CHECKED OFF ANY PROBLEMS ON THIS QUESTIONNAIRE, HOW DIFFICULT HAVE THESE PROBLEMS MADE IT FOR YOU TO DO YOUR WORK, TAKE CARE OF THINGS AT HOME, OR GET ALONG WITH OTHER PEOPLE: NOT DIFFICULT AT ALL
GAD7 TOTAL SCORE: 1
GAD7 TOTAL SCORE: 1
3. WORRYING TOO MUCH ABOUT DIFFERENT THINGS: NOT AT ALL
7. FEELING AFRAID AS IF SOMETHING AWFUL MIGHT HAPPEN: NOT AT ALL
GAD7 TOTAL SCORE: 1
2. NOT BEING ABLE TO STOP OR CONTROL WORRYING: NOT AT ALL
5. BEING SO RESTLESS THAT IT IS HARD TO SIT STILL: NOT AT ALL
1. FEELING NERVOUS, ANXIOUS, OR ON EDGE: NOT AT ALL
4. TROUBLE RELAXING: NOT AT ALL
8. IF YOU CHECKED OFF ANY PROBLEMS, HOW DIFFICULT HAVE THESE MADE IT FOR YOU TO DO YOUR WORK, TAKE CARE OF THINGS AT HOME, OR GET ALONG WITH OTHER PEOPLE?: NOT DIFFICULT AT ALL
6. BECOMING EASILY ANNOYED OR IRRITABLE: SEVERAL DAYS

## 2024-01-03 ASSESSMENT — PATIENT HEALTH QUESTIONNAIRE - PHQ9
10. IF YOU CHECKED OFF ANY PROBLEMS, HOW DIFFICULT HAVE THESE PROBLEMS MADE IT FOR YOU TO DO YOUR WORK, TAKE CARE OF THINGS AT HOME, OR GET ALONG WITH OTHER PEOPLE: NOT DIFFICULT AT ALL
SUM OF ALL RESPONSES TO PHQ QUESTIONS 1-9: 2
SUM OF ALL RESPONSES TO PHQ QUESTIONS 1-9: 2

## 2024-01-03 ASSESSMENT — PAIN SCALES - GENERAL: PAINLEVEL: NO PAIN (0)

## 2024-01-04 LAB
PTH-INTACT SERPL-MCNC: 31 PG/ML (ref 15–65)
VIT D+METAB SERPL-MCNC: 23 NG/ML (ref 20–50)

## 2024-01-09 ENCOUNTER — HOSPITAL ENCOUNTER (OUTPATIENT)
Dept: ULTRASOUND IMAGING | Facility: HOSPITAL | Age: 34
Discharge: HOME | End: 2024-01-09
Attending: NURSE PRACTITIONER | Admitting: NURSE PRACTITIONER
Payer: COMMERCIAL

## 2024-01-09 ENCOUNTER — MYC MEDICAL ADVICE (OUTPATIENT)
Dept: FAMILY MEDICINE | Facility: OTHER | Age: 34
End: 2024-01-09

## 2024-01-09 DIAGNOSIS — R94.4 ABNORMAL RENAL FUNCTION TEST: Primary | ICD-10-CM

## 2024-01-09 DIAGNOSIS — R79.89 ELEVATED SERUM CREATININE: ICD-10-CM

## 2024-01-09 PROCEDURE — 76770 US EXAM ABDO BACK WALL COMP: CPT

## 2024-01-11 NOTE — TELEPHONE ENCOUNTER
Pended up BMP per lab note from 01/09/2024 to recheck renal function in 4 weeks.  Diagnosis: abnormal renal function test.  Please review and sign if appropriate.    Pt is scheduled for labs only appointment on 02/09/2024.

## 2024-02-09 ENCOUNTER — LAB (OUTPATIENT)
Dept: LAB | Facility: OTHER | Age: 34
End: 2024-02-09
Payer: COMMERCIAL

## 2024-02-09 DIAGNOSIS — R94.4 ABNORMAL RENAL FUNCTION TEST: ICD-10-CM

## 2024-02-09 LAB
ANION GAP SERPL CALCULATED.3IONS-SCNC: 8 MMOL/L (ref 7–15)
BUN SERPL-MCNC: 13.8 MG/DL (ref 6–20)
CALCIUM SERPL-MCNC: 9.6 MG/DL (ref 8.6–10)
CHLORIDE SERPL-SCNC: 103 MMOL/L (ref 98–107)
CREAT SERPL-MCNC: 1.32 MG/DL (ref 0.67–1.17)
DEPRECATED HCO3 PLAS-SCNC: 28 MMOL/L (ref 22–29)
EGFRCR SERPLBLD CKD-EPI 2021: 73 ML/MIN/1.73M2
GLUCOSE SERPL-MCNC: 97 MG/DL (ref 70–99)
POTASSIUM SERPL-SCNC: 4.3 MMOL/L (ref 3.4–5.3)
SODIUM SERPL-SCNC: 139 MMOL/L (ref 135–145)

## 2024-02-09 PROCEDURE — 36415 COLL VENOUS BLD VENIPUNCTURE: CPT

## 2024-02-09 PROCEDURE — 80048 BASIC METABOLIC PNL TOTAL CA: CPT

## 2024-02-12 ENCOUNTER — TELEPHONE (OUTPATIENT)
Dept: FAMILY MEDICINE | Facility: OTHER | Age: 34
End: 2024-02-12

## 2024-02-12 DIAGNOSIS — R94.4 ABNORMAL RENAL FUNCTION TEST: Primary | ICD-10-CM

## 2024-02-12 NOTE — TELEPHONE ENCOUNTER
Results requested: LAB test result on 2-9-24      Best number to reach patient at: [phone]     Best time to call patient: 11-11:30am pt on lunch    Send to care team in basket.

## 2024-04-09 NOTE — PROGRESS NOTES
"  Assessment & Plan     (F32.1) Current moderate episode of major depressive disorder without prior episode (H)  (primary encounter diagnosis)  (F41.9) Anxiety  Comment: controlled, feeling better  Plan: Will continue the Wellbutrin and reassess in 12 months, sooner with new or worsening symptoms.     (R94.4) Abnormal renal function test  Plan: Basic metabolic panel        Will recheck levels today. Will notify patient of the results when available and intervene accordingly.   He does see nephrology tomorrow.         BMI  Estimated body mass index is 29.59 kg/m  as calculated from the following:    Height as of this encounter: 1.892 m (6' 2.5\").    Weight as of this encounter: 106 kg (233 lb 9.6 oz).           Sandoval Gallegos is a 33 year old, presenting for the following health issues:  Depression and Anxiety    History of Present Illness       Reason for visit:  Follow up about the past high lab numbers    He eats 2-3 servings of fruits and vegetables daily.He consumes 1 sweetened beverage(s) daily.He exercises with enough effort to increase his heart rate 10 to 19 minutes per day.  He exercises with enough effort to increase his heart rate 3 or less days per week. He is missing 2 dose(s) of medications per week.       Depression and Anxiety   How are you doing with your depression since your last visit? Improved   How are you doing with your anxiety since your last visit?  Improved   Are you having other symptoms that might be associated with depression or anxiety? No  Have you had a significant life event? No   Do you have any concerns with your use of alcohol or other drugs? No  No thoughts of self harm.   Taking Wellbutrin without side effects.     Recent Kidney function was slightly decreased. Does not take NSAIDs. Normal renal US on 1/8/2024. Will recheck kidney function today. No issues voiding. No hematuria.     Social History     Tobacco Use    Smoking status: Some Days     Current packs/day: 0.00     " "Average packs/day: 0.1 packs/day for 15.0 years (1.5 ttl pk-yrs)     Types: Pipe, Cigarettes     Start date: 1/1/2006     Last attempt to quit: 12/30/2020     Years since quitting: 3.2    Smokeless tobacco: Never    Tobacco comments:     Pt denies quit plan 9/18/23   Vaping Use    Vaping status: Never Used   Substance Use Topics    Alcohol use: Yes     Comment: drinks once a week    Drug use: No         6/14/2023     9:11 AM 1/3/2024     8:34 AM 4/16/2024    12:53 PM   PHQ   PHQ-9 Total Score 1 2 0   Q9: Thoughts of better off dead/self-harm past 2 weeks Not at all Not at all Not at all         6/14/2023     9:12 AM 1/3/2024     8:35 AM 4/16/2024    12:53 PM   JONAH-7 SCORE   Total Score 1 (minimal anxiety) 1 (minimal anxiety)    Total Score 1 1 0         4/16/2024    12:53 PM   Last PHQ-9   1.  Little interest or pleasure in doing things 0   2.  Feeling down, depressed, or hopeless 0   3.  Trouble falling or staying asleep, or sleeping too much 0   4.  Feeling tired or having little energy 0   5.  Poor appetite or overeating 0   6.  Feeling bad about yourself 0   7.  Trouble concentrating 0   8.  Moving slowly or restless 0   Q9: Thoughts of better off dead/self-harm past 2 weeks 0   PHQ-9 Total Score 0         4/16/2024    12:53 PM   JONAH-7    1. Feeling nervous, anxious, or on edge 0   2. Not being able to stop or control worrying 0   3. Worrying too much about different things 0   4. Trouble relaxing 0   5. Being so restless that it is hard to sit still 0   6. Becoming easily annoyed or irritable 0   7. Feeling afraid, as if something awful might happen 0   JONAH-7 Total Score 0           Review of Systems  Constitutional, HEENT, cardiovascular, pulmonary, gi and gu systems are negative, except as otherwise noted.      Objective    /80 (BP Location: Left arm, Patient Position: Sitting, Cuff Size: Adult Regular)   Pulse 80   Temp 97.9  F (36.6  C) (Tympanic)   Ht 1.892 m (6' 2.5\")   Wt 106 kg (233 lb 9.6 oz) "   SpO2 96%   BMI 29.59 kg/m    Body mass index is 29.59 kg/m .  Physical Exam   GENERAL: alert and no distress  NECK: no adenopathy, no asymmetry, masses, or scars  RESP: lungs clear to auscultation - no rales, rhonchi or wheezes  CV: regular rate and rhythm, normal S1 S2, no S3 or S4, no murmur, click or rub, no peripheral edema  ABDOMEN: soft, nontender, no hepatosplenomegaly, no masses and bowel sounds normal  MS: no gross musculoskeletal defects noted, no edema  NEURO: Normal strength and tone, mentation intact and speech normal  PSYCH: mentation appears normal, affect normal/bright    BMP in process        Signed Electronically by: Dodie Hebert NP

## 2024-04-16 ENCOUNTER — OFFICE VISIT (OUTPATIENT)
Dept: FAMILY MEDICINE | Facility: OTHER | Age: 34
End: 2024-04-16
Attending: NURSE PRACTITIONER
Payer: COMMERCIAL

## 2024-04-16 VITALS
WEIGHT: 233.6 LBS | HEIGHT: 75 IN | HEART RATE: 80 BPM | OXYGEN SATURATION: 96 % | TEMPERATURE: 97.9 F | BODY MASS INDEX: 29.05 KG/M2 | SYSTOLIC BLOOD PRESSURE: 110 MMHG | DIASTOLIC BLOOD PRESSURE: 80 MMHG

## 2024-04-16 DIAGNOSIS — R94.4 ABNORMAL RENAL FUNCTION TEST: ICD-10-CM

## 2024-04-16 DIAGNOSIS — F41.9 ANXIETY: ICD-10-CM

## 2024-04-16 DIAGNOSIS — F32.1 CURRENT MODERATE EPISODE OF MAJOR DEPRESSIVE DISORDER WITHOUT PRIOR EPISODE (H): Primary | ICD-10-CM

## 2024-04-16 LAB
ANION GAP SERPL CALCULATED.3IONS-SCNC: 10 MMOL/L (ref 7–15)
BUN SERPL-MCNC: 14.5 MG/DL (ref 6–20)
CALCIUM SERPL-MCNC: 9.5 MG/DL (ref 8.6–10)
CHLORIDE SERPL-SCNC: 101 MMOL/L (ref 98–107)
CREAT SERPL-MCNC: 1.33 MG/DL (ref 0.67–1.17)
DEPRECATED HCO3 PLAS-SCNC: 26 MMOL/L (ref 22–29)
EGFRCR SERPLBLD CKD-EPI 2021: 72 ML/MIN/1.73M2
GLUCOSE SERPL-MCNC: 80 MG/DL (ref 70–99)
POTASSIUM SERPL-SCNC: 4.7 MMOL/L (ref 3.4–5.3)
SODIUM SERPL-SCNC: 137 MMOL/L (ref 135–145)

## 2024-04-16 PROCEDURE — 36415 COLL VENOUS BLD VENIPUNCTURE: CPT | Performed by: NURSE PRACTITIONER

## 2024-04-16 PROCEDURE — 80048 BASIC METABOLIC PNL TOTAL CA: CPT | Performed by: NURSE PRACTITIONER

## 2024-04-16 PROCEDURE — 99213 OFFICE O/P EST LOW 20 MIN: CPT | Performed by: NURSE PRACTITIONER

## 2024-04-16 ASSESSMENT — ANXIETY QUESTIONNAIRES
GAD7 TOTAL SCORE: 0
GAD7 TOTAL SCORE: 0
5. BEING SO RESTLESS THAT IT IS HARD TO SIT STILL: NOT AT ALL
7. FEELING AFRAID AS IF SOMETHING AWFUL MIGHT HAPPEN: NOT AT ALL
2. NOT BEING ABLE TO STOP OR CONTROL WORRYING: NOT AT ALL
6. BECOMING EASILY ANNOYED OR IRRITABLE: NOT AT ALL
3. WORRYING TOO MUCH ABOUT DIFFERENT THINGS: NOT AT ALL
1. FEELING NERVOUS, ANXIOUS, OR ON EDGE: NOT AT ALL

## 2024-04-16 ASSESSMENT — PATIENT HEALTH QUESTIONNAIRE - PHQ9
SUM OF ALL RESPONSES TO PHQ QUESTIONS 1-9: 0
5. POOR APPETITE OR OVEREATING: NOT AT ALL

## 2024-04-16 ASSESSMENT — PAIN SCALES - GENERAL: PAINLEVEL: NO PAIN (0)

## 2024-07-19 ENCOUNTER — MYC MEDICAL ADVICE (OUTPATIENT)
Dept: FAMILY MEDICINE | Facility: OTHER | Age: 34
End: 2024-07-19

## 2024-07-19 DIAGNOSIS — Z12.11 SCREEN FOR COLON CANCER: Primary | ICD-10-CM

## 2024-08-07 ENCOUNTER — PREP FOR PROCEDURE (OUTPATIENT)
Dept: SURGERY | Facility: OTHER | Age: 34
End: 2024-08-07

## 2024-08-07 ENCOUNTER — OFFICE VISIT (OUTPATIENT)
Dept: SURGERY | Facility: OTHER | Age: 34
End: 2024-08-07
Attending: NURSE PRACTITIONER
Payer: COMMERCIAL

## 2024-08-07 VITALS
HEART RATE: 80 BPM | HEIGHT: 76 IN | SYSTOLIC BLOOD PRESSURE: 130 MMHG | OXYGEN SATURATION: 98 % | BODY MASS INDEX: 27.4 KG/M2 | DIASTOLIC BLOOD PRESSURE: 80 MMHG | RESPIRATION RATE: 16 BRPM | WEIGHT: 225 LBS

## 2024-08-07 DIAGNOSIS — Z83.719 FAMILY HISTORY OF COLON POLYPS, UNSPECIFIED: ICD-10-CM

## 2024-08-07 DIAGNOSIS — Z80.0 FAMILY HISTORY OF COLON CANCER: Primary | ICD-10-CM

## 2024-08-07 DIAGNOSIS — Z83.719 FAMILY HISTORY OF POLYPS IN THE COLON: ICD-10-CM

## 2024-08-07 PROCEDURE — 99203 OFFICE O/P NEW LOW 30 MIN: CPT | Performed by: NURSE PRACTITIONER

## 2024-08-07 RX ORDER — BISACODYL 5 MG/1
5 TABLET, DELAYED RELEASE ORAL DAILY PRN
Qty: 4 TABLET | Refills: 0 | Status: ON HOLD | OUTPATIENT
Start: 2024-08-07 | End: 2024-10-03

## 2024-08-07 ASSESSMENT — PAIN SCALES - GENERAL: PAINLEVEL: NO PAIN (0)

## 2024-08-07 NOTE — PROGRESS NOTES
CLINIC NOTE - CONSULT  8/7/2024    Patient:Roberto Meza    Referring Physician:  Dr. Hebert    Reason for Referral:  Twin Sister with cancerous colon polyp    This is a 33 year old male with a need of a colonoscopy for Twin Sister with cancerous colon polyp.     Personal history of colon cancer : NO   Family history of colon cancer : YES   Personal history of polyps : NO   Family history of polyps : YES--in father   History of Inflammatory Bowel Disease : NO   History of rectal bleeding : NO   Changes in bowel habits : NO   Last colonoscopy : never    Past Medical History:  History reviewed. No pertinent past medical history.    Past Surgical History:  Past Surgical History:   Procedure Laterality Date    growth removal[  2001    left leg    HERNIA REPAIR  07/2019    [facility]       Family History History:  Family History   Problem Relation Age of Onset    Dementia Maternal Grandmother     Cerebrovascular Disease Maternal Grandfather     Aneurysm Paternal Grandfather         after injury    Colon Cancer No family hx of        History of Tobacco Use:  History   Smoking Status    Some Days    Packs/day: 0.10    Years: 10.00    Types: Pipe, Cigarettes    Start date: 1/1/2006    Last attempt to quit: 12/30/2020   Smokeless Tobacco    Never       Current Medications:  Current Outpatient Medications   Medication Sig Dispense Refill    buPROPion (WELLBUTRIN XL) 150 MG 24 hr tablet Take 1 tablet (150 mg) by mouth every morning 90 tablet 3       Allergies:  Allergies   Allergen Reactions    Bee Venom      Per patient is undiagnosed.    Bees        ROS:  Constitutional: negative  Eyes: negative  Ears, nose, mouth, throat, and face: positive for dry throat  Respiratory: negative  Cardiovascular: negative  Gastrointestinal: positive for family history of colon cancer and colon polyps  Genitourinary:negative  Integument/breast: negative  Hematologic/lymphatic: negative  Musculoskeletal: negative  Neurological:  "negative  Behavioral/Psych: positive for marijuana use  Endocrine: negative  Allergic/Immunologic: negative    PHYSICAL EXAM:     Vital signs: /80 (BP Location: Left arm, Cuff Size: Adult Regular)   Pulse 80   Resp 16   Ht 1.93 m (6' 4\")   Wt 102.1 kg (225 lb)   SpO2 98%   BMI 27.39 kg/m     BMI: Body mass index is 27.39 kg/m .   General: Normal, healthy, cooperative, in no acute distress, alert   Skin: no rashes   Lungs: respirations are non-labored   Abdominal: non-distended   Extremities: No cyanosis, clubbing or edema noted bilaterally in Upper and Lower Extremities   Neurological: without deficit    ASSESSMENT:      33 year old male with a need of a colonoscopy for  family history of colon cancer/colon polyps .    PLAN:   A colonoscopy will be scheduled.  The procedure with their risks, benefits and alternatives were explained.  Risks include but are not limited to bleeding, perforation, missing lesions, need for additional procedures, reaction to anesthesia.  All the patients questions were answered.  The patient consents to proceed as planned.      "

## 2024-08-07 NOTE — PATIENT INSTRUCTIONS
Thank you for allowing Estephanie Issa CNP and our surgical team to participate in your care. Please call our health unit coordinator at [phone] with scheduling questions or the nurse at [phone] with any other questions or concerns.      You have been scheduled for:  Colonoscopy with  on 10/3/24.   You will use golytely bowel prep.  Please see handout for additional instruction.  You will not need a pre-operative appointment with your primary care provider.  You may call [phone] or [phone] with any questions.

## 2024-08-26 ENCOUNTER — MYC MEDICAL ADVICE (OUTPATIENT)
Dept: FAMILY MEDICINE | Facility: OTHER | Age: 34
End: 2024-08-26

## 2024-09-24 ENCOUNTER — ANESTHESIA EVENT (OUTPATIENT)
Dept: SURGERY | Facility: HOSPITAL | Age: 34
End: 2024-09-24
Payer: COMMERCIAL

## 2024-09-24 ASSESSMENT — LIFESTYLE VARIABLES: TOBACCO_USE: 1

## 2024-09-24 NOTE — ANESTHESIA PREPROCEDURE EVALUATION
Anesthesia Pre-Procedure Evaluation    Patient: Roberot Meza   MRN: [id number] : 1990        Procedure : Procedure(s):  COLONOSCOPY          No past medical history on file.   Past Surgical History:   Procedure Laterality Date    growth removal[      left leg    HERNIA REPAIR  2019    [facility]      Allergies   Allergen Reactions    Bee Venom      Per patient is undiagnosed.    Bees       Social History     Tobacco Use    Smoking status: Some Days     Current packs/day: 0.00     Average packs/day: 0.1 packs/day for 15.0 years (1.5 ttl pk-yrs)     Types: Pipe, Cigarettes     Start date: 2006     Last attempt to quit: 2020     Years since quitting: 3.7    Smokeless tobacco: Never    Tobacco comments:     Pt denies quit plan 23   Substance Use Topics    Alcohol use: Yes     Comment: drinks once a week      Wt Readings from Last 1 Encounters:   24 102.1 kg (225 lb)        Anesthesia Evaluation   Pt has had prior anesthetic.     History of anesthetic complications  - .  slow to wake up.    ROS/MED HX  ENT/Pulmonary:     (+)                tobacco use, Past use,                       Neurologic:     (+)      migraines,                          Cardiovascular:  - neg cardiovascular ROS     METS/Exercise Tolerance: >4 METS    Hematologic:  - neg hematologic  ROS     Musculoskeletal:  - neg musculoskeletal ROS     GI/Hepatic: Comment: Twin sister with colon polyp  Rare reflux     (+) GERD,       bowel prep,            Renal/Genitourinary: Comment: Saw nephrology 2024 creat approx 1.3 for many years. No concerns per nephrology, no follow up    (+) renal disease, type: CRI,            Endo:  - neg endo ROS     Psychiatric/Substance Use:     (+) psychiatric history anxiety and depression   Recreational drug usage: Cannabis (last 1 month ago).    Infectious Disease:  - neg infectious disease ROS     Malignancy:       Other:  - neg other ROS          Physical Exam    Airway       "  Mallampati: III   TM distance: > 3 FB   Neck ROM: full   Mouth opening: < 3 cm    Respiratory Devices and Support         Dental       (+) Minor Abnormalities - some fillings, tiny chips      Cardiovascular   cardiovascular exam normal       Rhythm and rate: regular and normal     Pulmonary   pulmonary exam normal        breath sounds clear to auscultation           OUTSIDE LABS:  CBC:   Lab Results   Component Value Date    WBC 5.3 09/15/2023    WBC 4.7 11/04/2022    HGB 14.5 09/15/2023    HGB 15.2 11/04/2022    HCT 42.5 09/15/2023    HCT 44.1 11/04/2022     09/15/2023     11/04/2022     BMP:   Lab Results   Component Value Date     04/16/2024     02/09/2024    POTASSIUM 4.7 04/16/2024    POTASSIUM 4.3 02/09/2024    CHLORIDE 101 04/16/2024    CHLORIDE 103 02/09/2024    CO2 26 04/16/2024    CO2 28 02/09/2024    BUN 14.5 04/16/2024    BUN 13.8 02/09/2024    CR 1.33 (H) 04/16/2024    CR 1.32 (H) 02/09/2024    GLC 80 04/16/2024    GLC 97 02/09/2024     COAGS:   Lab Results   Component Value Date    INR 0.96 09/15/2023     POC: No results found for: \"BGM\", \"HCG\", \"HCGS\"  HEPATIC:   Lab Results   Component Value Date    ALBUMIN 4.8 09/15/2023    PROTTOTAL 7.0 09/15/2023    ALT 40 09/15/2023    AST 38 09/15/2023    ALKPHOS 53 09/15/2023    BILITOTAL 0.7 09/15/2023     OTHER:   Lab Results   Component Value Date    LACT 1.1 09/15/2023    COMFORT 9.5 04/16/2024    MAG 2.2 01/03/2024    LIPASE 16 09/15/2023    TSH 2.02 01/03/2024       Anesthesia Plan    ASA Status:  2    NPO Status:  NPO Appropriate    Anesthesia Type: MAC.     - Reason for MAC: straight local not clinically adequate   Induction: Intravenous, Propofol.   Maintenance: Balanced.        Consents    Anesthesia Plan(s) and associated risks, benefits, and realistic alternatives discussed. Questions answered and patient/representative(s) expressed understanding.     - Discussed: Risks, Benefits and Alternatives for BOTH SEDATION and the " PROCEDURE were discussed     - Discussed with:  Patient      - Extended Intubation/Ventilatory Support Discussed: No.      - Patient is DNR/DNI Status: No     Use of blood products discussed: No .     Postoperative Care            Comments:    Other Comments: 8/7/24 Saw [facility],     Risks and benefits of MAC anesthetic discussed including dental damage, aspiration, loss of airway, conversion to general anesthetic, CV complications, MI, stroke, death. Pt wishes to proceed.            GEORGETTE Casanova CRNA    I have reviewed the pertinent notes and labs in the chart from the past 30 days and (re)examined the patient.  Any updates or changes from those notes are reflected in this note.

## 2024-09-30 NOTE — PROGRESS NOTES
Assessment & Plan     (F32.1) Current moderate episode of major depressive disorder without prior episode (H)  (primary encounter diagnosis)  (F41.9) Anxiety  Comment: controlled  Plan: buPROPion (WELLBUTRIN XL) 150 MG 24 hr tablet        Continue Wellbutrin. Return with new or worsening symptoms.     (Z30.09) Encounter for vasectomy counseling  Comment: wanting vasectomy  Plan: Adult Urology  Referral        Urology referral placed.       The longitudinal plan of care for the diagnosis(es)/condition(s) as documented were addressed during this visit. Due to the added complexity in care, I will continue to support El in the subsequent management and with ongoing continuity of care.    Sandoval Gallegos is a 34 year old, presenting for the following health issues:  Depression, Anxiety, and Vasectomy (Referral)    HPI     Depression and Anxiety   How are you doing with your depression since your last visit? Improved- slightly  How are you doing with your anxiety since your last visit?  Improved- slightly  Are you having other symptoms that might be associated with depression or anxiety? Yes:  Irritable, crabby  Have you had a significant life event? OTHER: Recent child birth    Do you have any concerns with your use of alcohol or other drugs? No  No thoughts of self harm.   Taking Wellbutrin without side effects. Working well.      Patient is requesting a vasectomy. He does not want any more children.     Due for several vaccines. Agreeable to his Hep B and influenza vaccine.         Social History     Tobacco Use    Smoking status: Former     Current packs/day: 0.00     Average packs/day: 0.1 packs/day for 15.0 years (1.5 ttl pk-yrs)     Types: Pipe, Cigarettes     Start date: 1/1/2006     Quit date: 12/30/2020     Years since quitting: 3.7    Smokeless tobacco: Never    Tobacco comments:     Pt denies quit plan 9/18/23   Vaping Use    Vaping status: Never Used   Substance Use Topics    Alcohol use: Yes  "    Comment: drinks once a week; 1-2    Drug use: Yes     Types: Marijuana     Comment: smokes         1/3/2024     8:34 AM 4/16/2024    12:53 PM 10/2/2024     1:00 PM   PHQ   PHQ-9 Total Score 2 0 7   Q9: Thoughts of better off dead/self-harm past 2 weeks Not at all Not at all Not at all         1/3/2024     8:35 AM 4/16/2024    12:53 PM 10/2/2024     1:00 PM   JONAH-7 SCORE   Total Score 1 (minimal anxiety)     Total Score 1 0 7         10/2/2024     1:00 PM   Last PHQ-9   1.  Little interest or pleasure in doing things 2   2.  Feeling down, depressed, or hopeless 1   3.  Trouble falling or staying asleep, or sleeping too much 0   4.  Feeling tired or having little energy 1   5.  Poor appetite or overeating 1   6.  Feeling bad about yourself 0   7.  Trouble concentrating 2   8.  Moving slowly or restless 0   Q9: Thoughts of better off dead/self-harm past 2 weeks 0   PHQ-9 Total Score 7   Difficulty at work, home, or with people Not difficult at all         10/2/2024     1:00 PM   JONAH-7    1. Feeling nervous, anxious, or on edge 1   2. Not being able to stop or control worrying 0   3. Worrying too much about different things 1   4. Trouble relaxing 2   5. Being so restless that it is hard to sit still 1   6. Becoming easily annoyed or irritable 2   7. Feeling afraid, as if something awful might happen 0   JONAH-7 Total Score 7   If you checked any problems, how difficult have they made it for you to do your work, take care of things at home, or get along with other people? Not difficult at all       Review of Systems  Constitutional, HEENT, cardiovascular, pulmonary, gi and gu systems are negative, except as otherwise noted.      Objective    /80   Pulse 78   Temp 99  F (37.2  C) (Tympanic)   Resp 16   Ht 1.93 m (6' 4\")   Wt 100.4 kg (221 lb 6.4 oz)   SpO2 97%   BMI 26.95 kg/m    Body mass index is 26.95 kg/m .  Physical Exam     Exam:  Constitutional: healthy, alert, and no distress  Head: Normocephalic. " No masses, lesions, tenderness or abnormalities  Cardiovascular: RRR. No murmurs, clicks gallops or rub  Respiratory: Good diaphragmatic excursion. Lungs clear  Neurologic: Gait normal. Sensation grossly WNL.  Psychiatric: mentation appears normal and affect normal/bright          Signed Electronically by: Dodie Hebert NP

## 2024-10-02 ENCOUNTER — OFFICE VISIT (OUTPATIENT)
Dept: FAMILY MEDICINE | Facility: OTHER | Age: 34
End: 2024-10-02
Attending: NURSE PRACTITIONER
Payer: COMMERCIAL

## 2024-10-02 VITALS
RESPIRATION RATE: 16 BRPM | OXYGEN SATURATION: 97 % | HEART RATE: 78 BPM | WEIGHT: 221.4 LBS | TEMPERATURE: 99 F | DIASTOLIC BLOOD PRESSURE: 80 MMHG | SYSTOLIC BLOOD PRESSURE: 114 MMHG | BODY MASS INDEX: 26.96 KG/M2 | HEIGHT: 76 IN

## 2024-10-02 DIAGNOSIS — F32.1 CURRENT MODERATE EPISODE OF MAJOR DEPRESSIVE DISORDER WITHOUT PRIOR EPISODE (H): Primary | ICD-10-CM

## 2024-10-02 DIAGNOSIS — F41.9 ANXIETY: ICD-10-CM

## 2024-10-02 DIAGNOSIS — Z30.09 ENCOUNTER FOR VASECTOMY COUNSELING: ICD-10-CM

## 2024-10-02 DIAGNOSIS — Z23 NEED FOR PROPHYLACTIC VACCINATION AND INOCULATION AGAINST INFLUENZA: ICD-10-CM

## 2024-10-02 PROBLEM — R79.89 ELEVATED SERUM CREATININE: Status: ACTIVE | Noted: 2024-10-02

## 2024-10-02 PROCEDURE — 90472 IMMUNIZATION ADMIN EACH ADD: CPT | Performed by: NURSE PRACTITIONER

## 2024-10-02 PROCEDURE — 90656 IIV3 VACC NO PRSV 0.5 ML IM: CPT | Performed by: NURSE PRACTITIONER

## 2024-10-02 PROCEDURE — 90471 IMMUNIZATION ADMIN: CPT | Performed by: NURSE PRACTITIONER

## 2024-10-02 PROCEDURE — 99213 OFFICE O/P EST LOW 20 MIN: CPT | Mod: 25 | Performed by: NURSE PRACTITIONER

## 2024-10-02 PROCEDURE — 90746 HEPB VACCINE 3 DOSE ADULT IM: CPT | Performed by: NURSE PRACTITIONER

## 2024-10-02 RX ORDER — BUPROPION HYDROCHLORIDE 150 MG/1
150 TABLET ORAL EVERY MORNING
Qty: 90 TABLET | Refills: 3 | Status: SHIPPED | OUTPATIENT
Start: 2024-10-02

## 2024-10-02 ASSESSMENT — ANXIETY QUESTIONNAIRES
1. FEELING NERVOUS, ANXIOUS, OR ON EDGE: SEVERAL DAYS
6. BECOMING EASILY ANNOYED OR IRRITABLE: MORE THAN HALF THE DAYS
GAD7 TOTAL SCORE: 7
IF YOU CHECKED OFF ANY PROBLEMS ON THIS QUESTIONNAIRE, HOW DIFFICULT HAVE THESE PROBLEMS MADE IT FOR YOU TO DO YOUR WORK, TAKE CARE OF THINGS AT HOME, OR GET ALONG WITH OTHER PEOPLE: NOT DIFFICULT AT ALL
GAD7 TOTAL SCORE: 7
3. WORRYING TOO MUCH ABOUT DIFFERENT THINGS: SEVERAL DAYS
2. NOT BEING ABLE TO STOP OR CONTROL WORRYING: NOT AT ALL
5. BEING SO RESTLESS THAT IT IS HARD TO SIT STILL: SEVERAL DAYS
7. FEELING AFRAID AS IF SOMETHING AWFUL MIGHT HAPPEN: NOT AT ALL
4. TROUBLE RELAXING: MORE THAN HALF THE DAYS

## 2024-10-02 ASSESSMENT — PAIN SCALES - GENERAL: PAINLEVEL: NO PAIN (0)

## 2024-10-02 ASSESSMENT — PATIENT HEALTH QUESTIONNAIRE - PHQ9: SUM OF ALL RESPONSES TO PHQ QUESTIONS 1-9: 7

## 2024-10-02 NOTE — DISCHARGE INSTRUCTIONS
Colonoscopy: What to Expect at Home  Your Recovery  After a colonoscopy, you'll stay at the clinic until you wake up. Then you can go home. But you'll need to arrange for a ride. Your doctor will tell you when you can eat and do your other usual activities.  Your doctor will talk to you about when you'll need your next colonoscopy. Your doctor can help you decide how often you need to be checked. This will depend on the results of your test and your risk for colorectal cancer.  After the test, you may be bloated or have gas pains. You may need to pass gas. If a biopsy was done or a polyp was removed, you may have streaks of blood in your stool (feces) for a few days. Problems such as heavy rectal bleeding may not occur until several weeks after the test. This isn't common. But it can happen after polyps are removed.  This care sheet gives you a general idea about how long it will take for you to recover. But each person recovers at a different pace. Follow the steps below to get better as quickly as possible.  How can you care for yourself at home?  Activity    Rest when you feel tired.     You can do your normal activities when it feels okay to do so.   Diet    Follow your doctor's directions for eating.     Unless your doctor has told you not to, drink plenty of fluids. This helps to replace the fluids that were lost during the colon prep.     Do not drink alcohol.   Medicines    Your doctor will tell you if and when you can restart your medicines. You will also be given instructions about taking any new medicines.     If you stopped taking aspirin or some other blood thinner, your doctor will tell you when to start taking it again.     If polyps were removed or a biopsy was done during the test, your doctor may tell you not to take aspirin or other anti-inflammatory medicines for a few days. These include ibuprofen (Advil, Motrin) and naproxen (Aleve).   Other instructions    For your safety, do not drive or  "operate machinery until the medicine wears off and you can think clearly. Your doctor may tell you not to drive or operate machinery until the day after your test.     Do not sign legal documents or make major decisions until the medicine wears off and you can think clearly. The anesthesia can make it hard for you to fully understand what you are agreeing to.   Follow-up care is a key part of your treatment and safety. Be sure to make and go to all appointments, and call your doctor if you are having problems. It's also a good idea to know your test results and keep a list of the medicines you take.  When should you call for help?   Call 911 anytime you think you may need emergency care. For example, call if:    You passed out (lost consciousness).     You pass maroon or bloody stools.     You have trouble breathing.   Call your doctor now or seek immediate medical care if:    You have pain that does not get better after you take pain medicine.     You are sick to your stomach or cannot drink fluids.     You have new or worse belly pain.     You have blood in your stools.     You have a fever.     You cannot pass stools or gas.   Watch closely for changes in your health, and be sure to contact your doctor if you have any problems.  Where can you learn more?  Go to https://www.[employer].net/patiented  Enter E264 in the search box to learn more about \"Colonoscopy: What to Expect at Home.\"  Current as of: October 25, 2023               Content Version: 14.0    1404-9823 [employer].   Care instructions adapted under license by your healthcare professional. If you have questions about a medical condition or this instruction, always ask your healthcare professional. [employer] disclaims any warranty or liability for your use of this information.    After Anesthesia (Sleep Medicine)  What should I do after anesthesia?  You should rest and relax for the next 24 hours. Avoid risky or difficult " (strenuous) activity. A responsible adult should stay with you overnight.  Don't drive or use any heavy equipment for 24 hours. Even if you feel normal, your reactions may be affected by the sleep medicine given to you.  Don't drink alcohol or make any important decisions for 24 hours.  Slowly get back to your regular diet, as you feel able.  How should I expect to feel?  It's normal to feel dizzy, light-headed, or faint for up to a full day after anesthesia or while taking pain medicine. If this happens:   Sit down for a few minutes before standing.  Have someone help you when you get up to walk or use the bathroom.  If you have nausea (feel sick to your stomach) or vomit (throw up):   Drink clear liquids (such as apple juice, ginger ale, broth, or 7UP) until you feel better.  If you feel sick to your stomach, or you keep vomiting for 24 hours, please call the doctor.  What else should I know?  You might have a dry mouth, sore throat, muscle aches, or trouble sleeping. These should go away after 24 hours.  Please contact your doctor if you have any other symptoms that concern you, such as fever, pain, bleeding, fluid drainage, swelling, or headache, or if it's been over 8 to 10 hours and you still aren't able to pee (urinate).  If you have a history of sleep apnea, it's very important to use your CPAP machine for the next 24 hours when you nap or sleep.   For informational purposes only. Not to replace the advice of your health care provider. Copyright   2023 [employer] [facility]. All rights reserved. Clinically reviewed by Pravin Hussein MD. [employer] [id number] - REV 09/23.

## 2024-10-03 ENCOUNTER — ANESTHESIA (OUTPATIENT)
Dept: SURGERY | Facility: HOSPITAL | Age: 34
End: 2024-10-03
Payer: COMMERCIAL

## 2024-10-03 ENCOUNTER — HOSPITAL ENCOUNTER (OUTPATIENT)
Facility: HOSPITAL | Age: 34
Discharge: HOME | End: 2024-10-03
Attending: SURGERY | Admitting: SURGERY
Payer: COMMERCIAL

## 2024-10-03 VITALS
SYSTOLIC BLOOD PRESSURE: 116 MMHG | TEMPERATURE: 98.2 F | BODY MASS INDEX: 25.21 KG/M2 | RESPIRATION RATE: 16 BRPM | HEIGHT: 76 IN | HEART RATE: 93 BPM | DIASTOLIC BLOOD PRESSURE: 81 MMHG | OXYGEN SATURATION: 95 % | WEIGHT: 207 LBS

## 2024-10-03 PROCEDURE — G0105 COLORECTAL SCRN; HI RISK IND: HCPCS | Performed by: SURGERY

## 2024-10-03 PROCEDURE — 710N000012 HC RECOVERY PHASE 2, PER MINUTE: Performed by: SURGERY

## 2024-10-03 PROCEDURE — 258N000003 HC RX IP 258 OP 636: Performed by: NURSE ANESTHETIST, CERTIFIED REGISTERED

## 2024-10-03 PROCEDURE — 45378 DIAGNOSTIC COLONOSCOPY: CPT | Performed by: NURSE ANESTHETIST, CERTIFIED REGISTERED

## 2024-10-03 PROCEDURE — 360N000075 HC SURGERY LEVEL 2, PER MIN: Performed by: SURGERY

## 2024-10-03 PROCEDURE — 272N000001 HC OR GENERAL SUPPLY STERILE: Performed by: SURGERY

## 2024-10-03 PROCEDURE — 250N000011 HC RX IP 250 OP 636: Performed by: NURSE ANESTHETIST, CERTIFIED REGISTERED

## 2024-10-03 PROCEDURE — 250N000009 HC RX 250: Performed by: NURSE ANESTHETIST, CERTIFIED REGISTERED

## 2024-10-03 PROCEDURE — 370N000017 HC ANESTHESIA TECHNICAL FEE, PER MIN: Performed by: SURGERY

## 2024-10-03 PROCEDURE — 999N000141 HC STATISTIC PRE-PROCEDURE NURSING ASSESSMENT: Performed by: SURGERY

## 2024-10-03 RX ORDER — PROPOFOL 10 MG/ML
INJECTION, EMULSION INTRAVENOUS CONTINUOUS PRN
Status: DISCONTINUED | OUTPATIENT
Start: 2024-10-03 | End: 2024-10-03

## 2024-10-03 RX ORDER — ONDANSETRON 2 MG/ML
4 INJECTION INTRAMUSCULAR; INTRAVENOUS EVERY 30 MIN PRN
Status: DISCONTINUED | OUTPATIENT
Start: 2024-10-03 | End: 2024-10-03 | Stop reason: HOSPADM

## 2024-10-03 RX ORDER — PROPOFOL 10 MG/ML
INJECTION, EMULSION INTRAVENOUS PRN
Status: DISCONTINUED | OUTPATIENT
Start: 2024-10-03 | End: 2024-10-03

## 2024-10-03 RX ORDER — NALOXONE HYDROCHLORIDE 0.4 MG/ML
0.1 INJECTION, SOLUTION INTRAMUSCULAR; INTRAVENOUS; SUBCUTANEOUS
Status: DISCONTINUED | OUTPATIENT
Start: 2024-10-03 | End: 2024-10-03 | Stop reason: HOSPADM

## 2024-10-03 RX ORDER — LIDOCAINE 40 MG/G
CREAM TOPICAL
Status: DISCONTINUED | OUTPATIENT
Start: 2024-10-03 | End: 2024-10-03 | Stop reason: HOSPADM

## 2024-10-03 RX ORDER — ONDANSETRON 4 MG/1
4 TABLET, ORALLY DISINTEGRATING ORAL EVERY 30 MIN PRN
Status: DISCONTINUED | OUTPATIENT
Start: 2024-10-03 | End: 2024-10-03 | Stop reason: HOSPADM

## 2024-10-03 RX ORDER — SODIUM CHLORIDE, SODIUM LACTATE, POTASSIUM CHLORIDE, CALCIUM CHLORIDE 600; 310; 30; 20 MG/100ML; MG/100ML; MG/100ML; MG/100ML
INJECTION, SOLUTION INTRAVENOUS CONTINUOUS
Status: DISCONTINUED | OUTPATIENT
Start: 2024-10-03 | End: 2024-10-03 | Stop reason: HOSPADM

## 2024-10-03 RX ORDER — OXYCODONE HYDROCHLORIDE 5 MG/1
5 TABLET ORAL
Status: DISCONTINUED | OUTPATIENT
Start: 2024-10-03 | End: 2024-10-03 | Stop reason: HOSPADM

## 2024-10-03 RX ORDER — LIDOCAINE HYDROCHLORIDE 20 MG/ML
INJECTION, SOLUTION INFILTRATION; PERINEURAL PRN
Status: DISCONTINUED | OUTPATIENT
Start: 2024-10-03 | End: 2024-10-03

## 2024-10-03 RX ORDER — DEXAMETHASONE SODIUM PHOSPHATE 10 MG/ML
4 INJECTION, SOLUTION INTRAMUSCULAR; INTRAVENOUS
Status: DISCONTINUED | OUTPATIENT
Start: 2024-10-03 | End: 2024-10-03 | Stop reason: HOSPADM

## 2024-10-03 RX ADMIN — LIDOCAINE HYDROCHLORIDE 60 MG: 20 INJECTION, SOLUTION INFILTRATION; PERINEURAL at 14:45

## 2024-10-03 RX ADMIN — SODIUM CHLORIDE, POTASSIUM CHLORIDE, SODIUM LACTATE AND CALCIUM CHLORIDE: 600; 310; 30; 20 INJECTION, SOLUTION INTRAVENOUS at 14:42

## 2024-10-03 RX ADMIN — MIDAZOLAM 2 MG: 1 INJECTION INTRAMUSCULAR; INTRAVENOUS at 14:41

## 2024-10-03 RX ADMIN — PROPOFOL 100 MG: 10 INJECTION, EMULSION INTRAVENOUS at 14:45

## 2024-10-03 RX ADMIN — SODIUM CHLORIDE, POTASSIUM CHLORIDE, SODIUM LACTATE AND CALCIUM CHLORIDE: 600; 310; 30; 20 INJECTION, SOLUTION INTRAVENOUS at 14:41

## 2024-10-03 RX ADMIN — PROPOFOL 100 MCG/KG/MIN: 10 INJECTION, EMULSION INTRAVENOUS at 14:47

## 2024-10-03 ASSESSMENT — ACTIVITIES OF DAILY LIVING (ADL)
ADLS_ACUITY_SCORE: 35
ADLS_ACUITY_SCORE: 35
ADLS_ACUITY_SCORE: 33
ADLS_ACUITY_SCORE: 35

## 2024-10-03 NOTE — OR NURSING
Patient and responsible adult given discharge instructions with no questions regarding instructions. Geronimo score 20/20. Pain level 0/10.  Discharged from unit via ambulatory. Patient discharged to home.

## 2024-10-03 NOTE — ANESTHESIA POSTPROCEDURE EVALUATION
Patient: Roberto Meza    Procedure: Procedure(s):  COLONOSCOPY       Anesthesia Type:  MAC    Note:  Disposition: Outpatient   Postop Pain Control: Uneventful            Sign Out: Well controlled pain   PONV: No   Neuro/Psych: Uneventful            Sign Out: Acceptable/Baseline neuro status   Airway/Respiratory: Uneventful            Sign Out: Acceptable/Baseline resp. status   CV/Hemodynamics: Uneventful            Sign Out: Acceptable CV status; No obvious hypovolemia; No obvious fluid overload   Other NRE: NONE   DID A NON-ROUTINE EVENT OCCUR? No           Last vitals:  Vitals Value Taken Time   /81 10/03/24 1515   Temp 98.2  F (36.8  C) 10/03/24 1501   Pulse 93 10/03/24 1515   Resp 16 10/03/24 1515   SpO2 97 % 10/03/24 1519   Vitals shown include unfiled device data.    Electronically Signed By: GOERGETTE COX CRNA  October 3, 2024  4:05 PM

## 2024-10-03 NOTE — ANESTHESIA CARE TRANSFER NOTE
Patient: Roberto Meza    Procedure: Procedure(s):  COLONOSCOPY       Diagnosis: Family history of colon cancer [Z80.0]  Family history of polyps in the colon [Z83.189]  Diagnosis Additional Information: No value filed.    Anesthesia Type:   MAC     Note:    Oropharynx: oropharynx clear of all foreign objects and spontaneously breathing  Level of Consciousness: drowsy  Oxygen Supplementation: room air    Independent Airway: airway patency satisfactory and stable  Dentition: dentition unchanged  Vital Signs Stable: post-procedure vital signs reviewed and stable  Report to RN Given: handoff report given  Patient transferred to: Phase II    Handoff Report: Identifed the Patient, Identified the Reponsible Provider, Reviewed the pertinent medical history, Discussed the surgical course, Reviewed Intra-OP anesthesia mangement and issues during anesthesia, Set expectations for post-procedure period and Allowed opportunity for questions and acknowledgement of understanding  Vitals:  Vitals Value Taken Time   BP     Temp     Pulse     Resp     SpO2         Electronically Signed By: GEORGETTE Unger CRNA  October 3, 2024  3:01 PM

## 2024-10-03 NOTE — H&P
"HISTORY AND PHYSICAL - ENDOSCOPY   10/3/2024    Patient : Roberto Meza    Planned Procedures : Colonoscopy    This is a 34 year old male with a need for a colonoscopy for Screening colonoscopy, Family History of Colon Cancer, and Family History of Colon Polyps.      Past Medical History:  History reviewed. No pertinent past medical history.    Past Surgical History:  Past Surgical History:   Procedure Laterality Date    APPENDECTOMY      growth removal[  2001    left leg    HERNIA REPAIR  07/2019    [facility]       Family History History:  Family History   Problem Relation Age of Onset    Dementia Maternal Grandmother     Cerebrovascular Disease Maternal Grandfather     Aneurysm Paternal Grandfather         after injury    Colon Cancer No family hx of        History of Tobacco Use:  History   Smoking Status    Former    Types: Pipe, Cigarettes   Smokeless Tobacco    Never       Current Medications:  No current outpatient medications on file.       Allergies:  Allergies   Allergen Reactions    Bee Venom      Per patient is undiagnosed.    Bees        ROS:  Pertinent items are noted in HPI.    PHYSICAL EXAM:     Vital signs: /70   Pulse 75   Temp 98.9  F (37.2  C) (Tympanic)   Resp 16   Ht 1.93 m (6' 4\")   Wt 93.9 kg (207 lb)   SpO2 94%   BMI 25.20 kg/m     Weight: [unfilled]   BMI: Body mass index is 25.2 kg/m .   General: Normal, healthy, cooperative, in no acute distress, alert   Skin: no jaundice   HEENT: PERRLA and EOMI   Neck: supple   Lungs: clear to auscultation   CV: Regular rate and rhythm without murmer   Abdominal: abdomen is soft without significant tenderness, masses, organomegaly or guarding   Extremities: No cyanosis, clubbing or edema noted bilaterally in Upper and Lower Extremities   Neurological: without deficit    Assessment:   34 year old male with need of a colonoscopy for Screening colonoscopy, Family History of Colon Cancer, and Family History of Colon Polyps:    Plan:   Will " plan on doing a colonoscopy.      The risks, benefits, and alternatives to the planned procedure were fully discussed with the patient and/or the patient's representative(s). The risks of bleeding, infection, death, missing pathology, the need for additional procedures intra-operatively, the possible need for intra-operative consults, the possible need for transfusion therapy, cardiopulmonary compromise, the possible need for additional surgery for a complication were discussed with the patient and/or the patient's representative(s). The patient's and/or patient's representative(s) questions were addressed and answered. Informed consent was obtained from the patient and/or the patient's representative(s). The patient and/or the patient's representative(s) consent to proceed.    Specific risks:  Risks include but are not limited to bleeding, perforation, missing lesions, need for additional procedures, reaction to anesthesia.  All the patients questions were answered.  The patient consents to proceed.  The procedures will be scheduled.

## 2024-10-29 ENCOUNTER — OFFICE VISIT (OUTPATIENT)
Dept: UROLOGY | Facility: OTHER | Age: 34
End: 2024-10-29
Attending: UROLOGY
Payer: COMMERCIAL

## 2024-10-29 VITALS
HEART RATE: 86 BPM | OXYGEN SATURATION: 97 % | DIASTOLIC BLOOD PRESSURE: 82 MMHG | RESPIRATION RATE: 16 BRPM | SYSTOLIC BLOOD PRESSURE: 118 MMHG

## 2024-10-29 DIAGNOSIS — Z30.09 ENCOUNTER FOR VASECTOMY COUNSELING: ICD-10-CM

## 2024-10-29 PROBLEM — N18.31 STAGE 3A CHRONIC KIDNEY DISEASE (H): Status: ACTIVE | Noted: 2024-04-17

## 2024-10-29 PROBLEM — R03.0 ELEVATED BLOOD PRESSURE READING IN OFFICE WITH WHITE COAT SYNDROME, WITHOUT DIAGNOSIS OF HYPERTENSION: Status: ACTIVE | Noted: 2024-04-17

## 2024-10-29 PROCEDURE — 99203 OFFICE O/P NEW LOW 30 MIN: CPT | Performed by: UROLOGY

## 2024-10-29 ASSESSMENT — PAIN SCALES - GENERAL: PAINLEVEL_OUTOF10: NO PAIN (0)

## 2024-10-29 NOTE — PROGRESS NOTES
Roberto Meza is a 34 year old gentleman seen today to discuss a possible vasectomy.    The patient 2 children, currently age 4 months and almost 6 years.  Currently using IUD for contraception.  He has no prior scrotal surgery or significant trauma.  He denies any current symptoms of pain, swelling, tenderness, masses.  He does not take any blood thinners.    He was pretty slow to wake up from anesthesia in the past. Did ok with colonoscopy sedation.    He is a .    Complete review of systems obtained.  Pertinent positives and negatives in HPI.    History reviewed. No pertinent past medical history.  Past Surgical History:   Procedure Laterality Date    APPENDECTOMY      COLONOSCOPY N/A 10/3/2024    Procedure: COLONOSCOPY;  Surgeon: Jonathan Jauregui MD;  Location: [facility]    growth removal[  2001    left leg    HERNIA REPAIR  07/2019    Chely Ray     Current Outpatient Medications   Medication Sig Dispense Refill    buPROPion (WELLBUTRIN XL) 150 MG 24 hr tablet Take 1 tablet (150 mg) by mouth every morning. 90 tablet 3     No current facility-administered medications for this visit.        Allergies   Allergen Reactions    Bee Venom      Per patient is undiagnosed.    Bees        Exam:   /82   Pulse 86   Resp 16   SpO2 97%     Gen: Pleasant, NAD  : Normal penis with orthotopic meatus.  No shaft lesions or plaques.  Scrotum is normal.  Bilateral testicles are normal size and consistency without masses or tenderness.  Epididymis normal bilaterally.  Spermatic cords normal with easily palpable vas deferens bilaterally.  No hernias.    Assessment and Plan:  34 year old gentleman presenting to discuss a vasectomy.    We reviewed a vasectomy in detail.  We discussed that it is a permanent form of sterilization.  There are reversal methods available, but they are expensive and not always effective.  We reviewed relevant anatomy and the technical aspects of the procedure.  We  discussed local anesthetic, sedation.  He was counseled on risks of bleeding, infection, post-procedural pain that can rarely persist.  We discussed activity limitations following the procedure, as well as the recommendation to refrain from ejaculation for 7 days.  He was told of the need for an alternative form of contraception until a postvasectomy semen analysis demonstrates no sperm.  Post-vasectomy semen analysis is generally completed around 3 months after the procedure.  The patient was counseled that after his semen analysis demonstrates azoospermia, there is a quoted 1 in 2000 risk of achieving a pregnancy.     All questions were answered to his satisfaction.  We will work to schedule in the near future

## 2024-11-08 ENCOUNTER — OFFICE VISIT (OUTPATIENT)
Dept: FAMILY MEDICINE | Facility: OTHER | Age: 34
End: 2024-11-08
Attending: FAMILY MEDICINE
Payer: COMMERCIAL

## 2024-11-08 ENCOUNTER — ANCILLARY PROCEDURE (OUTPATIENT)
Dept: GENERAL RADIOLOGY | Facility: OTHER | Age: 34
End: 2024-11-08
Attending: FAMILY MEDICINE
Payer: COMMERCIAL

## 2024-11-08 VITALS
HEART RATE: 93 BPM | OXYGEN SATURATION: 96 % | TEMPERATURE: 98.7 F | BODY MASS INDEX: 28.24 KG/M2 | DIASTOLIC BLOOD PRESSURE: 72 MMHG | WEIGHT: 231.9 LBS | SYSTOLIC BLOOD PRESSURE: 124 MMHG | RESPIRATION RATE: 18 BRPM | HEIGHT: 76 IN

## 2024-11-08 DIAGNOSIS — J20.9 ACUTE BRONCHITIS, UNSPECIFIED ORGANISM: ICD-10-CM

## 2024-11-08 DIAGNOSIS — R05.8 PRODUCTIVE COUGH: ICD-10-CM

## 2024-11-08 DIAGNOSIS — R05.8 PRODUCTIVE COUGH: Primary | ICD-10-CM

## 2024-11-08 LAB
FLUAV RNA SPEC QL NAA+PROBE: NEGATIVE
FLUBV RNA RESP QL NAA+PROBE: NEGATIVE
RSV RNA SPEC NAA+PROBE: NEGATIVE
SARS-COV-2 RNA RESP QL NAA+PROBE: NEGATIVE

## 2024-11-08 PROCEDURE — 99213 OFFICE O/P EST LOW 20 MIN: CPT | Performed by: FAMILY MEDICINE

## 2024-11-08 PROCEDURE — 87637 SARSCOV2&INF A&B&RSV AMP PRB: CPT | Performed by: FAMILY MEDICINE

## 2024-11-08 PROCEDURE — 71046 X-RAY EXAM CHEST 2 VIEWS: CPT | Mod: TC | Performed by: RADIOLOGY

## 2024-11-08 RX ORDER — ALBUTEROL SULFATE 90 UG/1
2 INHALANT RESPIRATORY (INHALATION) EVERY 4 HOURS PRN
Qty: 18 G | Refills: 0 | Status: SHIPPED | OUTPATIENT
Start: 2024-11-08

## 2024-11-08 RX ORDER — AZITHROMYCIN 250 MG/1
TABLET, FILM COATED ORAL
Qty: 6 TABLET | Refills: 0 | Status: SHIPPED | OUTPATIENT
Start: 2024-11-08 | End: 2024-11-13

## 2024-11-08 ASSESSMENT — PATIENT HEALTH QUESTIONNAIRE - PHQ9
SUM OF ALL RESPONSES TO PHQ QUESTIONS 1-9: 2
SUM OF ALL RESPONSES TO PHQ QUESTIONS 1-9: 2
10. IF YOU CHECKED OFF ANY PROBLEMS, HOW DIFFICULT HAVE THESE PROBLEMS MADE IT FOR YOU TO DO YOUR WORK, TAKE CARE OF THINGS AT HOME, OR GET ALONG WITH OTHER PEOPLE: NOT DIFFICULT AT ALL

## 2024-11-08 ASSESSMENT — PAIN SCALES - GENERAL: PAINLEVEL_OUTOF10: NO PAIN (0)

## 2024-11-08 NOTE — PROGRESS NOTES
Assessment & Plan     Productive cough  Acute bronchitis, unspecified organism  - XR CHEST 2 VW (Clinic Performed); Future  - Symptomatic Influenza A/B, RSV, & SARS-CoV2 PCR (COVID-19) Nose  - azithromycin (ZITHROMAX) 250 MG tablet; Take 2 tablets (500 mg) by mouth daily for 1 day, THEN 1 tablet (250 mg) daily for 4 days.  - albuterol (PROAIR HFA/PROVENTIL HFA/VENTOLIN HFA) 108 (90 Base) MCG/ACT inhaler; Inhale 2 puffs into the lungs every 4 hours as needed for shortness of breath, wheezing or cough.      Sandoval Gallegos is a 34 year old, presenting for the following health issues:  URI        11/8/2024    10:11 AM   Additional Questions   Roomed by Kathryn Martinez   Accompanied by self         11/8/2024    10:11 AM   Patient Reported Additional Medications   Patient reports taking the following new medications vit D       Started a week ago  Thursday  Fever 100.0, cough, coughed up blood  Still stick, no longer fever  Cough better through day and worse at night  Just runny nose during day and doesn't feel sick  Productive at night    Wife and 2 daughters got cold symptoms around same time, but they are feeling better        History of Present Illness       Reason for visit:  Persistant cough at night  Symptom onset:  1-2 weeks ago  Symptoms include:  Persistant cough at night and early morning  Symptom intensity:  Mild  Symptom progression:  Staying the same  Had these symptoms before:  No  What makes it worse:  No  What makes it better:  Cough drops and hot coffee He is missing 2 dose(s) of medications per week.  He is not taking prescribed medications regularly due to remembering to take.       Acute Illness  Acute illness concerns: cough and had fever   Onset/Duration: since last Sunday   Symptoms:  Fever: YES- had Sunday morning   Chills/Sweats: No  Headache (location?): No  Sinus Pressure: No  Conjunctivitis:  No  Ear Pain: no  Rhinorrhea: YES  Congestion: YES- worse at night   Sore Throat: YES some mornings  "  Cough: YES-productive of pale green sputum and sticky   Wheeze: No  Decreased Appetite: No  Nausea: No  Vomiting: No  Diarrhea: No  Dysuria/Freq.: No  Dysuria or Hematuria: No  Fatigue/Achiness: No  Sick/Strep Exposure: No  Therapies tried and outcome: cough drops             Objective    /72 (BP Location: Left arm, Patient Position: Sitting, Cuff Size: Adult Regular)   Pulse 93   Temp 98.7  F (37.1  C) (Tympanic)   Resp 18   Ht 1.93 m (6' 4\")   Wt 105.2 kg (231 lb 14.4 oz)   SpO2 96%   BMI 28.23 kg/m    Body mass index is 28.23 kg/m .  Physical Exam  Constitutional:       General: He is not in acute distress.     Appearance: Normal appearance.   HENT:      Head: Normocephalic and atraumatic.      Right Ear: Tympanic membrane normal.      Left Ear: Tympanic membrane normal.      Nose: Nose normal.      Mouth/Throat:      Mouth: Mucous membranes are moist.      Pharynx: Oropharynx is clear.   Eyes:      Conjunctiva/sclera: Conjunctivae normal.      Pupils: Pupils are equal, round, and reactive to light.   Cardiovascular:      Rate and Rhythm: Normal rate and regular rhythm.      Heart sounds: Normal heart sounds. No murmur heard.  Pulmonary:      Effort: Pulmonary effort is normal.      Comments: Coarse RUL on exam  Abdominal:      General: Bowel sounds are normal.      Palpations: Abdomen is soft.      Tenderness: There is no abdominal tenderness.   Lymphadenopathy:      Cervical: No cervical adenopathy.   Skin:     Coloration: Skin is not jaundiced.   Neurological:      Mental Status: He is alert and oriented to person, place, and time.                    Signed Electronically by: NOY JEONG DO    "

## 2024-11-22 NOTE — PROGRESS NOTES
Preoperative Evaluation  [facility] [place]  [address]  [place] [address]  Phone: [phone]  Primary Provider: Dodie Hebert NP  Pre-op Performing Provider: Dodie Hebert NP  Nov 27, 2024 11/27/2024   Surgical Information   What procedure is being done? Vasectomy   Facility or Hospital where procedure/surgery will be performed: [facility] [place]    Who is doing the procedure / surgery? Dr. Matt   Date of surgery / procedure: 12/10/2024    Time of surgery / procedure: TBD   Where do you plan to recover after surgery? at home with family        Patient-reported     Fax number for surgical facility: Note does not need to be faxed, will be available electronically in Epic.    Assessment & Plan     The proposed surgical procedure is considered LOW risk.     (Z01.818) Pre-op exam  (primary encounter diagnosis)  (Z30.09) Encounter for vasectomy counseling  Plan: CBC and CMP unremarkable. Cleared for surgery.    (F41.9) Anxiety  (F32.1) Current moderate episode of major depressive disorder without prior episode (H)  Plan: Stable.    (R94.4) Abnormal renal function test  Plan: Microalbuminuria unremarkable. Serum creatinine stable, has been evaluated by nephrology without concerns.    Risks and Recommendations  The patient has the following additional risks and recommendations for perioperative complications:   - No identified additional risk factors other than previously addressed    Additional Medication Instructions  Take all scheduled medications on the day of surgery    Recommendation  Approval given to proceed with proposed procedure, without further diagnostic evaluation.    Roberto Meza with a functional capacity of greater than four MET's. There are no obvious contraindications to proceeding with surgery at this time. Recommend that the surgeon review risks and benefits of the procedure prior to proceeding.      Was recommended to avoid eating and drinking anything 12 hours prior to  surgery unless his surgeon tells him otherwise.     The longitudinal plan of care for the diagnosis(es)/condition(s) as documented were addressed during this visit. Due to the added complexity in care, I will continue to support El in the subsequent management and with ongoing continuity of care.    Sandoval Gallegos is a 34 year old, presenting for the following:  Pre-Op Exam          11/27/2024    10:22 AM   Additional Questions   Roomed by eros guy   Accompanied by self         11/27/2024    10:22 AM   Patient Reported Additional Medications   Patient reports taking the following new medications none     HPI related to upcoming procedure: vasectomy for family planning.        11/27/2024   Pre-Op Questionnaire   Have you ever had a heart attack or stroke? No    Have you ever had surgery on your heart or blood vessels, such as a stent placement, a coronary artery bypass, or surgery on an artery in your head, neck, heart, or legs? No    Do you have chest pain with activity? No    Do you have a history of heart failure? No    Do you currently have a cold, bronchitis or symptoms of other infection? No    Do you have a cough, shortness of breath, or wheezing? No    Do you or anyone in your family have previous history of blood clots? No    Do you or does anyone in your family have a serious bleeding problem such as prolonged bleeding following surgeries or cuts? No    Have you ever had problems with anemia or been told to take iron pills? No    Have you had any abnormal blood loss such as black, tarry or bloody stools? No    Have you ever had a blood transfusion? No    Are you willing to have a blood transfusion if it is medically needed before, during, or after your surgery? Yes    Have you or any of your relatives ever had problems with anesthesia? (!) YES has a HARD time waking up    Do you have sleep apnea, excessive snoring or daytime drowsiness? No    Do you have any artifical heart valves or other implanted  medical devices like a pacemaker, defibrillator, or continuous glucose monitor? No    Do you have artificial joints? No    Are you allergic to latex? No        Patient-reported     Health Care Directive  Patient does not have a Health Care Directive: Discussed advance care planning with patient; however, patient declined at this time.    Preoperative Review of    reviewed - no record of controlled substances prescribed.    Status of Chronic Conditions:    Anxiety/depression: Stable.      Elevated serum creatinine: Has seen nephrologist, who had no concerns. Will recheck labs today.      Patient Active Problem List    Diagnosis Date Noted    Elevated serum creatinine-saw nephrology, were not concerned. 10/02/2024     Priority: Medium    Elevated blood pressure reading in office with white coat syndrome, without diagnosis of hypertension 04/17/2024     Priority: Medium    Stage 3a chronic kidney disease (H) 04/17/2024     Priority: Medium    Current moderate episode of major depressive disorder without prior episode (H) 06/14/2023     Priority: Medium    Anxiety 06/14/2023     Priority: Medium    Gastric reflux 04/24/2018     Priority: Medium      No past medical history on file.  Past Surgical History:   Procedure Laterality Date    APPENDECTOMY      COLONOSCOPY N/A 10/3/2024    Procedure: COLONOSCOPY;  Surgeon: Jonathan Jauregui MD;  Location: [facility]    growth removal[  2001    left leg    HERNIA REPAIR  07/2019    Chely Ray     Current Outpatient Medications   Medication Sig Dispense Refill    albuterol (PROAIR HFA/PROVENTIL HFA/VENTOLIN HFA) 108 (90 Base) MCG/ACT inhaler Inhale 2 puffs into the lungs every 4 hours as needed for shortness of breath, wheezing or cough. 18 g 0    buPROPion (WELLBUTRIN XL) 150 MG 24 hr tablet Take 1 tablet (150 mg) by mouth every morning. 90 tablet 3       Allergies   Allergen Reactions    Bee Venom      Per patient is undiagnosed.    Bees       Reacts to bee stings with  "localized swelling that lasts a week. Denies angioedema, airway swelling, or respiratory arrest.     Social History     Tobacco Use    Smoking status: Former     Current packs/day: 0.00     Average packs/day: 0.1 packs/day for 15.0 years (1.5 ttl pk-yrs)     Types: Pipe, Cigarettes     Start date: 1/1/2006     Quit date: 12/30/2020     Years since quitting: 3.9    Smokeless tobacco: Never    Tobacco comments:     Pt denies quit plan 9/18/23   Substance Use Topics    Alcohol use: Yes     Comment: drinks once a week; 1-2     Family History   Problem Relation Age of Onset    Dementia Maternal Grandmother     Cerebrovascular Disease Maternal Grandfather     Aneurysm Paternal Grandfather         after injury    Colon Cancer No family hx of      History   Drug Use    Types: Marijuana     Comment: smokes       Review of Systems  CONSTITUTIONAL: NEGATIVE for fever, chills, change in weight  INTEGUMENTARY/SKIN: NEGATIVE for worrisome rashes, moles or lesions  EYES: NEGATIVE for vision changes or irritation  ENT/MOUTH: NEGATIVE for ear, mouth and throat problems  RESP: NEGATIVE for significant cough or SOB  CV: NEGATIVE for chest pain, palpitations or peripheral edema  GI: NEGATIVE for nausea, abdominal pain, heartburn, or change in bowel habits  : NEGATIVE for frequency, dysuria, or hematuria  MUSCULOSKELETAL: NEGATIVE for significant arthralgias or myalgia  NEURO: NEGATIVE for weakness, dizziness or paresthesias  ENDOCRINE: NEGATIVE for temperature intolerance, skin/hair changes  HEME: NEGATIVE for bleeding problems  PSYCHIATRIC: NEGATIVE for changes in mood or affect    Objective    /80 (BP Location: Right arm, Patient Position: Sitting, Cuff Size: Adult Regular)   Pulse 70   Temp 97.7  F (36.5  C) (Tympanic)   Ht 1.93 m (6' 4\")   Wt 104.3 kg (229 lb 14.4 oz)   SpO2 96%   BMI 27.98 kg/m     Estimated body mass index is 27.98 kg/m  as calculated from the following:    Height as of this encounter: 1.93 m (6' " "4\").    Weight as of this encounter: 104.3 kg (229 lb 14.4 oz).  Physical Exam  GENERAL: alert and no distress  EYES: Eyes grossly normal to inspection, PERRL and conjunctivae and sclerae normal  HENT: ear canals and TM's normal, nose and mouth without ulcers or lesions  NECK: no adenopathy, no asymmetry, masses, or scars  RESP: lungs clear to auscultation - no rales, rhonchi or wheezes  CV: regular rate and rhythm, no murmur, click or rub, no peripheral edema  ABDOMEN: soft, nontender, no hepatosplenomegaly, no masses and bowel sounds normal  MS: no gross musculoskeletal defects noted, no edema  SKIN: no suspicious lesions or rashes  NEURO: Normal strength and tone, mentation intact and speech normal  PSYCH: mentation appears normal, affect normal/bright    Recent Labs   Lab Test 04/16/24  1316 02/09/24  1608    139   POTASSIUM 4.7 4.3   CR 1.33* 1.32*        Diagnostics  Recent Results (from the past 24 hours)   Basic metabolic panel    Collection Time: 11/27/24 10:53 AM   Result Value Ref Range    Sodium 140 135 - 145 mmol/L    Potassium 4.5 3.4 - 5.3 mmol/L    Chloride 104 98 - 107 mmol/L    Carbon Dioxide (CO2) 24 22 - 29 mmol/L    Anion Gap 12 7 - 15 mmol/L    Urea Nitrogen 17.5 6.0 - 20.0 mg/dL    Creatinine 1.28 (H) 0.67 - 1.17 mg/dL    GFR Estimate 75 >60 mL/min/1.73m2    Calcium 9.4 8.8 - 10.4 mg/dL    Glucose 82 70 - 99 mg/dL   Albumin Random Urine Quantitative with Creat Ratio    Collection Time: 11/27/24 10:53 AM   Result Value Ref Range    Creatinine Urine mg/dL 151.0 mg/dL    Albumin Urine mg/L <12.0 mg/L    Albumin Urine mg/g Cr     CBC with platelets and differential    Collection Time: 11/27/24 10:53 AM   Result Value Ref Range    WBC Count 4.9 4.0 - 11.0 10e3/uL    RBC Count 5.04 4.40 - 5.90 10e6/uL    Hemoglobin 15.1 13.3 - 17.7 g/dL    Hematocrit 43.3 40.0 - 53.0 %    MCV 86 78 - 100 fL    MCH 30.0 26.5 - 33.0 pg    MCHC 34.9 31.5 - 36.5 g/dL    RDW 12.2 10.0 - 15.0 %    Platelet Count 184 " 150 - 450 10e3/uL    % Neutrophils 65 %    % Lymphocytes 23 %    % Monocytes 8 %    % Eosinophils 3 %    % Basophils 0 %    % Immature Granulocytes 0 %    NRBCs per 100 WBC 0 <1 /100    Absolute Neutrophils 3.2 1.6 - 8.3 10e3/uL    Absolute Lymphocytes 1.1 0.8 - 5.3 10e3/uL    Absolute Monocytes 0.4 0.0 - 1.3 10e3/uL    Absolute Eosinophils 0.1 0.0 - 0.7 10e3/uL    Absolute Basophils 0.0 0.0 - 0.2 10e3/uL    Absolute Immature Granulocytes 0.0 <=0.4 10e3/uL    Absolute NRBCs 0.0 10e3/uL      No EKG required for low risk surgery (cataract, skin procedure, breast biopsy, etc), no history of coronary heart disease, significant arrhythmia, peripheral arterial disease or other structural heart disease. Patient is 34 years old.    Revised Cardiac Risk Index (RCRI)  The patient has the following serious cardiovascular risks for perioperative complications:   - No serious cardiac risks = 0 points     RCRI Interpretation: 0 points: Class I (very low risk - 0.4% complication rate)     LEANDRO Tabor-S  [facility] PA Program     I was present with the student who participated in the service and in the documentation of the note. I have verified the history and personally performed the physical exam and medical decision making. I agree with the assessment and plan of care as documented in the note.       Signed Electronically by: Dodie Hebert NP  A copy of this evaluation report is provided to the requesting physician.

## 2024-11-27 ENCOUNTER — OFFICE VISIT (OUTPATIENT)
Dept: FAMILY MEDICINE | Facility: OTHER | Age: 34
End: 2024-11-27
Attending: NURSE PRACTITIONER
Payer: COMMERCIAL

## 2024-11-27 VITALS
WEIGHT: 229.9 LBS | HEART RATE: 70 BPM | HEIGHT: 76 IN | BODY MASS INDEX: 28 KG/M2 | TEMPERATURE: 97.7 F | SYSTOLIC BLOOD PRESSURE: 120 MMHG | OXYGEN SATURATION: 96 % | DIASTOLIC BLOOD PRESSURE: 80 MMHG

## 2024-11-27 DIAGNOSIS — Z30.09 ENCOUNTER FOR VASECTOMY COUNSELING: ICD-10-CM

## 2024-11-27 DIAGNOSIS — F32.1 CURRENT MODERATE EPISODE OF MAJOR DEPRESSIVE DISORDER WITHOUT PRIOR EPISODE (H): ICD-10-CM

## 2024-11-27 DIAGNOSIS — R94.4 ABNORMAL RENAL FUNCTION TEST: ICD-10-CM

## 2024-11-27 DIAGNOSIS — F41.9 ANXIETY: ICD-10-CM

## 2024-11-27 DIAGNOSIS — Z01.818 PRE-OP EXAM: Primary | ICD-10-CM

## 2024-11-27 LAB
ANION GAP SERPL CALCULATED.3IONS-SCNC: 12 MMOL/L (ref 7–15)
BASOPHILS # BLD AUTO: 0 10E3/UL (ref 0–0.2)
BASOPHILS NFR BLD AUTO: 0 %
BUN SERPL-MCNC: 17.5 MG/DL (ref 6–20)
CALCIUM SERPL-MCNC: 9.4 MG/DL (ref 8.8–10.4)
CHLORIDE SERPL-SCNC: 104 MMOL/L (ref 98–107)
CREAT SERPL-MCNC: 1.28 MG/DL (ref 0.67–1.17)
CREAT UR-MCNC: 151 MG/DL
EGFRCR SERPLBLD CKD-EPI 2021: 75 ML/MIN/1.73M2
EOSINOPHIL # BLD AUTO: 0.1 10E3/UL (ref 0–0.7)
EOSINOPHIL NFR BLD AUTO: 3 %
ERYTHROCYTE [DISTWIDTH] IN BLOOD BY AUTOMATED COUNT: 12.2 % (ref 10–15)
GLUCOSE SERPL-MCNC: 82 MG/DL (ref 70–99)
HCO3 SERPL-SCNC: 24 MMOL/L (ref 22–29)
HCT VFR BLD AUTO: 43.3 % (ref 40–53)
HGB BLD-MCNC: 15.1 G/DL (ref 13.3–17.7)
IMM GRANULOCYTES # BLD: 0 10E3/UL
IMM GRANULOCYTES NFR BLD: 0 %
LYMPHOCYTES # BLD AUTO: 1.1 10E3/UL (ref 0.8–5.3)
LYMPHOCYTES NFR BLD AUTO: 23 %
MCH RBC QN AUTO: 30 PG (ref 26.5–33)
MCHC RBC AUTO-ENTMCNC: 34.9 G/DL (ref 31.5–36.5)
MCV RBC AUTO: 86 FL (ref 78–100)
MICROALBUMIN UR-MCNC: <12 MG/L
MICROALBUMIN/CREAT UR: NORMAL MG/G{CREAT}
MONOCYTES # BLD AUTO: 0.4 10E3/UL (ref 0–1.3)
MONOCYTES NFR BLD AUTO: 8 %
NEUTROPHILS # BLD AUTO: 3.2 10E3/UL (ref 1.6–8.3)
NEUTROPHILS NFR BLD AUTO: 65 %
NRBC # BLD AUTO: 0 10E3/UL
NRBC BLD AUTO-RTO: 0 /100
PLATELET # BLD AUTO: 184 10E3/UL (ref 150–450)
POTASSIUM SERPL-SCNC: 4.5 MMOL/L (ref 3.4–5.3)
RBC # BLD AUTO: 5.04 10E6/UL (ref 4.4–5.9)
SODIUM SERPL-SCNC: 140 MMOL/L (ref 135–145)
WBC # BLD AUTO: 4.9 10E3/UL (ref 4–11)

## 2024-11-27 ASSESSMENT — PAIN SCALES - GENERAL: PAINLEVEL_OUTOF10: NO PAIN (0)

## 2024-11-27 NOTE — PATIENT INSTRUCTIONS
Hold all medications the morning of surgery.       Patient Education   Preparing for Your Surgery  For Adults  Getting started  In most cases, a nurse will call to review your health history and instructions. They will give you an arrival time based on your scheduled surgery time. Please be ready to share:  Your doctor's clinic name and phone number  Your medical, surgical, and anesthesia history  A list of allergies and sensitivities  A list of medicines, including herbal treatments and over-the-counter drugs  Whether the patient has a legal guardian (ask how to send us the papers in advance)  Note: You may not receive a call if you were seen at our PAC (Preoperative Assessment Center).  Please tell us if you're pregnant--or if there's any chance you might be pregnant. Some surgeries may injure a fetus (unborn baby), so they require a pregnancy test. Surgeries that are safe for a fetus don't always need a test, and you can choose whether to have one.   Preparing for surgery  Within 10 to 30 days of surgery: Have a pre-op exam (sometimes called an H&P, or History and Physical). This can be done at a clinic or pre-operative center.  If you're having a , you may not need this exam. Talk to your care team.  At your pre-op exam, talk to your care team about all medicines you take. (This includes CBD oil and any drugs, such as THC, marijuana, and other forms of cannabis.) If you need to stop any medicine before surgery, ask when to start taking it again.  This is for your safety. Many medicines and drugs can make you bleed too much during surgery. Some change how well surgery (anesthesia) drugs work.  Call your insurance company to let them know you're having surgery. (If you don't have insurance, call [phone].)  Call your clinic if there's any change in your health. This includes a scrape or scratch near the surgery site, or any signs of a cold (sore throat, runny nose, cough, rash, fever).  Eating and  drinking guidelines  For your safety: Unless your surgeon tells you otherwise, follow the guidelines below.  Eat and drink as normal until 8 hours before you arrive for surgery. After that, no food or milk. You can spit out gum when you arrive.  Drink clear liquids until 2 hours before you arrive. These are liquids you can see through, like water, Gatorade, and Propel Water. They also include plain black coffee and tea (no cream or milk).  No alcohol for 24 hours before you arrive. The night before surgery, stop any drinks that contain THC.  If your care team tells you to take medicine on the morning of surgery, it's okay to take it with a sip of water. No other medicines or drugs are allowed (including CBD oil)--follow your care team's instructions.  If you have questions the day of surgery, call your hospital or surgery center.   Preventing infection  Shower or bathe the night before and the morning of surgery. Follow the instructions your clinic gave you. (If no instructions, use regular soap.)  Don't shave or clip hair near your surgery site. We'll remove the hair if needed.  Don't smoke or vape the morning of surgery. No chewing tobacco for 6 hours before you arrive. A nicotine patch is okay. You may spit out nicotine gum when you arrive.  For some surgeries, the surgeon will tell you to fully quit smoking and nicotine.  We will make every effort to keep you safe from infection. We will:  Clean our hands often with soap and water (or an alcohol-based hand rub).  Clean the skin at your surgery site with a special soap that kills germs.  Give you a special gown to keep you warm. (Cold raises the risk of infection.)  Wear hair covers, masks, gowns, and gloves during surgery.  Give antibiotic medicine, if prescribed. Not all surgeries need this medicine.  What to bring on the day of surgery  Photo ID and insurance card  Copy of your health care directive, if you have one  Glasses and hearing aids (bring cases)  You  can't wear contacts during surgery  Inhaler and eye drops, if you use them (tell us about these when you arrive)  CPAP machine or breathing device, if you use them  A few personal items, if spending the night  If you have . . .  A pacemaker, ICD (cardiac defibrillator), or other implant: Bring the ID card.  An implanted stimulator: Bring the remote control.  A legal guardian: Bring a copy of the certified (court-stamped) guardianship papers.  Please remove any jewelry, including body piercings. Leave jewelry and other valuables at home.  If you're going home the day of surgery  You must have a responsible adult drive you home. They should stay with you overnight as well.  If you don't have someone to stay with you, and you aren't safe to go home alone, we may keep you overnight. Insurance often won't pay for this.  After surgery  If it's hard to control your pain or you need more pain medicine, please call your surgeon's office.  Questions?   If you have any questions for your care team, list them here:   ____________________________________________________________________________________________________________________________________________________________________________________________________________________________________________________________  For informational purposes only. Not to replace the advice of your health care provider. Copyright   2003, 2019 [facility]. All rights reserved. Clinically reviewed by Pravin Hussein MD. [employer] ([place]) [id number] - REV 08/24.

## 2024-12-02 ENCOUNTER — MYC MEDICAL ADVICE (OUTPATIENT)
Dept: FAMILY MEDICINE | Facility: OTHER | Age: 34
End: 2024-12-02

## 2024-12-11 ENCOUNTER — TELEPHONE (OUTPATIENT)
Dept: UROLOGY | Facility: OTHER | Age: 34
End: 2024-12-11

## 2024-12-11 ENCOUNTER — PREP FOR PROCEDURE (OUTPATIENT)
Dept: UROLOGY | Facility: OTHER | Age: 34
End: 2024-12-11

## 2024-12-11 DIAGNOSIS — Z30.2 ENCOUNTER FOR VASECTOMY: Primary | ICD-10-CM

## 2024-12-11 NOTE — TELEPHONE ENCOUNTER
Patient called and was wondering about his time for surgery tomorrow.   He states he is supposed to be having a vasectomy tomorrow 12/12 with Dr. Matt.  Reviewed chart and I do not see where patient is scheduled on 12/12.  Message sent to urology to look into and reach out to patient.

## 2024-12-12 RX ORDER — CHOLECALCIFEROL (VITAMIN D3) 50 MCG
1 TABLET ORAL DAILY
COMMUNITY

## 2024-12-19 ENCOUNTER — HOSPITAL ENCOUNTER (OUTPATIENT)
Facility: HOSPITAL | Age: 34
Discharge: HOME | End: 2024-12-19
Attending: UROLOGY | Admitting: UROLOGY
Payer: COMMERCIAL

## 2024-12-19 VITALS
HEIGHT: 76 IN | WEIGHT: 230 LBS | BODY MASS INDEX: 28.01 KG/M2 | RESPIRATION RATE: 18 BRPM | TEMPERATURE: 97 F | HEART RATE: 72 BPM | SYSTOLIC BLOOD PRESSURE: 112 MMHG | DIASTOLIC BLOOD PRESSURE: 78 MMHG | OXYGEN SATURATION: 93 %

## 2024-12-19 DIAGNOSIS — Z30.2 ENCOUNTER FOR MALE STERILIZATION PROCEDURE: Primary | ICD-10-CM

## 2024-12-19 PROCEDURE — 55250 REMOVAL OF SPERM DUCT(S): CPT | Performed by: UROLOGY

## 2024-12-19 PROCEDURE — 360N000075 HC SURGERY LEVEL 2, PER MIN: Performed by: UROLOGY

## 2024-12-19 PROCEDURE — 710N000012 HC RECOVERY PHASE 2, PER MINUTE: Performed by: UROLOGY

## 2024-12-19 PROCEDURE — 250N000009 HC RX 250: Performed by: UROLOGY

## 2024-12-19 PROCEDURE — 370N000017 HC ANESTHESIA TECHNICAL FEE, PER MIN: Performed by: UROLOGY

## 2024-12-19 PROCEDURE — 999N000141 HC STATISTIC PRE-PROCEDURE NURSING ASSESSMENT: Performed by: UROLOGY

## 2024-12-19 PROCEDURE — 272N000001 HC OR GENERAL SUPPLY STERILE: Performed by: UROLOGY

## 2024-12-19 RX ORDER — DEXAMETHASONE SODIUM PHOSPHATE 10 MG/ML
4 INJECTION, SOLUTION INTRAMUSCULAR; INTRAVENOUS
Status: DISCONTINUED | OUTPATIENT
Start: 2024-12-19 | End: 2024-12-19 | Stop reason: HOSPADM

## 2024-12-19 RX ORDER — ONDANSETRON 2 MG/ML
4 INJECTION INTRAMUSCULAR; INTRAVENOUS EVERY 30 MIN PRN
Status: DISCONTINUED | OUTPATIENT
Start: 2024-12-19 | End: 2024-12-19 | Stop reason: HOSPADM

## 2024-12-19 RX ORDER — NALOXONE HYDROCHLORIDE 0.4 MG/ML
0.1 INJECTION, SOLUTION INTRAMUSCULAR; INTRAVENOUS; SUBCUTANEOUS
Status: DISCONTINUED | OUTPATIENT
Start: 2024-12-19 | End: 2024-12-19 | Stop reason: HOSPADM

## 2024-12-19 RX ORDER — BACITRACIN ZINC 500 [USP'U]/G
OINTMENT TOPICAL PRN
Status: DISCONTINUED | OUTPATIENT
Start: 2024-12-19 | End: 2024-12-19 | Stop reason: HOSPADM

## 2024-12-19 RX ORDER — LIDOCAINE 40 MG/G
CREAM TOPICAL
Status: DISCONTINUED | OUTPATIENT
Start: 2024-12-19 | End: 2024-12-19 | Stop reason: HOSPADM

## 2024-12-19 RX ORDER — ONDANSETRON 4 MG/1
4 TABLET, ORALLY DISINTEGRATING ORAL EVERY 30 MIN PRN
Status: DISCONTINUED | OUTPATIENT
Start: 2024-12-19 | End: 2024-12-19 | Stop reason: HOSPADM

## 2024-12-19 RX ORDER — SODIUM CHLORIDE, SODIUM LACTATE, POTASSIUM CHLORIDE, CALCIUM CHLORIDE 600; 310; 30; 20 MG/100ML; MG/100ML; MG/100ML; MG/100ML
INJECTION, SOLUTION INTRAVENOUS CONTINUOUS
Status: DISCONTINUED | OUTPATIENT
Start: 2024-12-19 | End: 2024-12-19 | Stop reason: HOSPADM

## 2024-12-19 RX ORDER — LIDOCAINE HYDROCHLORIDE 20 MG/ML
INJECTION, SOLUTION INFILTRATION; PERINEURAL PRN
Status: DISCONTINUED | OUTPATIENT
Start: 2024-12-19 | End: 2024-12-19 | Stop reason: HOSPADM

## 2024-12-19 ASSESSMENT — ACTIVITIES OF DAILY LIVING (ADL)
ADLS_ACUITY_SCORE: 18

## 2024-12-19 NOTE — OR NURSING
Patient did well.  Moving without difficulties.  Patient ready to go home.     Patient and responsible adult given discharge instructions with no questions regarding instructions. Geronimo score 20. Pain level 0/10.  Discharged from unit via ambulatory. Patient discharged to home with wife.

## 2024-12-19 NOTE — OP NOTE
Preoperative diagnosis  Elective sterilization    Postoperative diagnosis  Elective sterilization    Procedure performed  Bilateral vasectomy    Surgeon  Roberto Matt MD    Anesthesia  8 mL lidocaine 2% local injection  Moderate sedation    Complications  None    EBL  <1 mL    Specimen  None    Complications  None    Disposition  Stable to recovery    Indications  34 year old male agreed to undergo the above named procedure after discussion of the alternatives, risks and benefits.  Informed consent was obtained.      Procedure  The patient was brought to the procedure room and placed in supine position.  Moderate sedation was induced. His scrotum was prepped with betadine and he was draped in a sterile fashion.  A timeout was performed.    Lidocaine 2% was used to anesthetize the scrotal skin as well as perivasal sheath initially on the patient's left.  A 1 cm skin incision was created with the scalpel. A sharp-tip mosquito was used to dissect down to the vas sheath. A vas clamp was utilized through this incision to grasp the vas.  The left vas was elevated to the skin surface and dissected free of its perivasal sheath.  The vas was doubly clamped, the intervening segment was transected and discarded, and the lumen was cauterized both proximally and distally.  Each end of the vas deferens was clipped. The skin incision was closed with the 3-0 Vicryl suture.  An identical procedure was performed on the right side. The patient was washed, dried, awakened from anesthesia and transferred stable from the OR to the PACU.     Post operative instructions were repeated to the patient: that he would remain fertile until he submits a post-vasectomy semen analysis to confirm sterility in 3 months.  The patient expressed understanding.

## 2024-12-19 NOTE — OR NURSING
Patient continues to do well.  Patient denies any pain.  Scant amount of blood noted on gauze;  IV removed.  Discharge instructions reviewed with patient and wife; both verbalize understanding. Patient getting ready at this time.

## 2024-12-19 NOTE — DISCHARGE INSTRUCTIONS
After Anesthesia (Sleep Medicine)  What should I do after anesthesia?  You should rest and relax for the next 24 hours. Avoid risky or difficult (strenuous) activity. A responsible adult should stay with you overnight.  Don't drive or use any heavy equipment for 24 hours. Even if you feel normal, your reactions may be affected by the sleep medicine given to you.  Don't drink alcohol or make any important decisions for 24 hours.  Slowly get back to your regular diet, as you feel able.  How should I expect to feel?  It's normal to feel dizzy, light-headed, or faint for up to a full day after anesthesia or while taking pain medicine. If this happens:   Sit down for a few minutes before standing.  Have someone help you when you get up to walk or use the bathroom.  If you have nausea (feel sick to your stomach) or vomit (throw up):   Drink clear liquids (such as apple juice, ginger ale, broth, or 7UP) until you feel better.  If you feel sick to your stomach, or you keep vomiting for 24 hours, please call the doctor.  What else should I know?  You might have a dry mouth, sore throat, muscle aches, or trouble sleeping. These should go away after 24 hours.  Please contact your doctor if you have any other symptoms that concern you, such as fever, pain, bleeding, fluid drainage, swelling, or headache, or if it's been over 8 to 10 hours and you still aren't able to pee (urinate).  If you have a history of sleep apnea, it's very important to use your CPAP machine for the next 24 hours when you nap or sleep.   For informational purposes only. Not to replace the advice of your health care provider. Copyright   2023 [employer]. All rights reserved. Clinically reviewed by Pravin Hussein MD. [employer] [id number] - REV 09/23.

## 2025-01-06 ENCOUNTER — OFFICE VISIT (OUTPATIENT)
Dept: FAMILY MEDICINE | Facility: OTHER | Age: 35
End: 2025-01-06
Attending: NURSE PRACTITIONER
Payer: COMMERCIAL

## 2025-01-06 VITALS
SYSTOLIC BLOOD PRESSURE: 110 MMHG | HEIGHT: 76 IN | OXYGEN SATURATION: 95 % | DIASTOLIC BLOOD PRESSURE: 78 MMHG | WEIGHT: 236 LBS | TEMPERATURE: 97.1 F | HEART RATE: 80 BPM | BODY MASS INDEX: 28.74 KG/M2

## 2025-01-06 DIAGNOSIS — Z23 NEED FOR VACCINATION: ICD-10-CM

## 2025-01-06 DIAGNOSIS — N18.31 STAGE 3A CHRONIC KIDNEY DISEASE (H): ICD-10-CM

## 2025-01-06 DIAGNOSIS — Z00.00 HEALTH MAINTENANCE EXAMINATION: Primary | ICD-10-CM

## 2025-01-06 DIAGNOSIS — Z13.220 LIPID SCREENING: ICD-10-CM

## 2025-01-06 DIAGNOSIS — F41.9 ANXIETY: ICD-10-CM

## 2025-01-06 DIAGNOSIS — Z11.59 ENCOUNTER FOR HCV SCREENING TEST FOR LOW RISK PATIENT: ICD-10-CM

## 2025-01-06 DIAGNOSIS — F32.1 CURRENT MODERATE EPISODE OF MAJOR DEPRESSIVE DISORDER WITHOUT PRIOR EPISODE (H): ICD-10-CM

## 2025-01-06 PROBLEM — R03.0 ELEVATED BLOOD PRESSURE READING IN OFFICE WITH WHITE COAT SYNDROME, WITHOUT DIAGNOSIS OF HYPERTENSION: Status: RESOLVED | Noted: 2024-04-17 | Resolved: 2025-01-06

## 2025-01-06 LAB
CHOLEST SERPL-MCNC: 183 MG/DL
FASTING STATUS PATIENT QL REPORTED: YES
HCV AB SERPL QL IA: NONREACTIVE
HDLC SERPL-MCNC: 35 MG/DL
LDLC SERPL CALC-MCNC: 118 MG/DL
NONHDLC SERPL-MCNC: 148 MG/DL
TRIGL SERPL-MCNC: 149 MG/DL
VIT D+METAB SERPL-MCNC: 24 NG/ML (ref 20–50)

## 2025-01-06 SDOH — HEALTH STABILITY: PHYSICAL HEALTH: ON AVERAGE, HOW MANY DAYS PER WEEK DO YOU ENGAGE IN MODERATE TO STRENUOUS EXERCISE (LIKE A BRISK WALK)?: 4 DAYS

## 2025-01-06 ASSESSMENT — ANXIETY QUESTIONNAIRES
2. NOT BEING ABLE TO STOP OR CONTROL WORRYING: NOT AT ALL
7. FEELING AFRAID AS IF SOMETHING AWFUL MIGHT HAPPEN: NOT AT ALL
7. FEELING AFRAID AS IF SOMETHING AWFUL MIGHT HAPPEN: NOT AT ALL
5. BEING SO RESTLESS THAT IT IS HARD TO SIT STILL: NOT AT ALL
GAD7 TOTAL SCORE: 3
GAD7 TOTAL SCORE: 3
8. IF YOU CHECKED OFF ANY PROBLEMS, HOW DIFFICULT HAVE THESE MADE IT FOR YOU TO DO YOUR WORK, TAKE CARE OF THINGS AT HOME, OR GET ALONG WITH OTHER PEOPLE?: NOT DIFFICULT AT ALL
1. FEELING NERVOUS, ANXIOUS, OR ON EDGE: SEVERAL DAYS
GAD7 TOTAL SCORE: 3
IF YOU CHECKED OFF ANY PROBLEMS ON THIS QUESTIONNAIRE, HOW DIFFICULT HAVE THESE PROBLEMS MADE IT FOR YOU TO DO YOUR WORK, TAKE CARE OF THINGS AT HOME, OR GET ALONG WITH OTHER PEOPLE: NOT DIFFICULT AT ALL
4. TROUBLE RELAXING: SEVERAL DAYS
6. BECOMING EASILY ANNOYED OR IRRITABLE: SEVERAL DAYS
3. WORRYING TOO MUCH ABOUT DIFFERENT THINGS: NOT AT ALL

## 2025-01-06 ASSESSMENT — SOCIAL DETERMINANTS OF HEALTH (SDOH): HOW OFTEN DO YOU GET TOGETHER WITH FRIENDS OR RELATIVES?: ONCE A WEEK

## 2025-01-06 ASSESSMENT — PATIENT HEALTH QUESTIONNAIRE - PHQ9
10. IF YOU CHECKED OFF ANY PROBLEMS, HOW DIFFICULT HAVE THESE PROBLEMS MADE IT FOR YOU TO DO YOUR WORK, TAKE CARE OF THINGS AT HOME, OR GET ALONG WITH OTHER PEOPLE: SOMEWHAT DIFFICULT
SUM OF ALL RESPONSES TO PHQ QUESTIONS 1-9: 7
SUM OF ALL RESPONSES TO PHQ QUESTIONS 1-9: 7

## 2025-01-06 ASSESSMENT — PAIN SCALES - GENERAL: PAINLEVEL_OUTOF10: NO PAIN (0)

## 2025-01-06 NOTE — LETTER
My Depression Action Plan  Name: Roberto Meza   Date of Birth 1990  Date: 1/6/2025    My doctor: Dodie Hebert   My clinic: [facility] [place]  [place] [address]  [phone]            GREEN    ZONE   Good Control    What it looks like:   Things are going generally well. You have normal ups and downs. You may even feel depressed from time to time, but bad moods usually last less than a day.   What you need to do:  Continue to care for yourself (see self care plan)  Check your depression survival kit and update it as needed  Follow your physician s recommendations including any medication.  Do not stop taking medication unless you consult with your physician first.             YELLOW         ZONE Getting Worse    What it looks like:   Depression is starting to interfere with your life.   It may be hard to get out of bed; you may be starting to isolate yourself from others.  Symptoms of depression are starting to last most all day and this has happened for several days.   You may have suicidal thoughts but they are not constant.   What you need to do:     Call your care team. Your response to treatment will improve if you keep your care team informed of your progress. Yellow periods are signs an adjustment may need to be made.     Continue your self-care.  Just get dressed and ready for the day.  Don't give yourself time to talk yourself out of it.    Talk to someone in your support network.    Open up your Depression Self-Care Plan/Wellness Kit.             RED    ZONE Medical Alert - Get Help    What it looks like:   Depression is seriously interfering with your life.   You may experience these or other symptoms: You can t get out of bed most days, can t work or engage in other necessary activities, you have trouble taking care of basic hygiene, or basic responsibilities, thoughts of suicide or death that will not go away, self-injurious behavior.     What you  need to do:  Call your care team and request a same-day appointment. If they are not available (weekends or after hours) call your local crisis line, emergency room or 911.          Depression Self-Care Plan / Wellness Kit    Many people find that medication and therapy are helpful treatments for managing depression. In addition, making small changes to your everyday life can help to boost your mood and improve your wellbeing. Below are some tips for you to consider. Be sure to talk with your medical provider and/or behavioral health consultant if your symptoms are worsening or not improving.     Sleep   Sleep hygiene  means all of the habits that support good, restful sleep. It includes maintaining a consistent bedtime and wake time, using your bedroom only for sleeping or sex, and keeping the bedroom dark and free of distractions like a computer, smartphone, or television.     Develop a Healthy Routine  Maintain good hygiene. Get out of bed in the morning, make your bed, brush your teeth, take a shower, and get dressed. Don t spend too much time viewing media that makes you feel stressed. Find time to relax each day.    Exercise  Get some form of exercise every day. This will help reduce pain and release endorphins, the  feel good  chemicals in your brain. It can be as simple as just going for a walk or doing some gardening, anything that will get you moving.      Diet  Strive to eat healthy foods, including fruits and vegetables. Drink plenty of water. Avoid excessive sugar, caffeine, alcohol, and other mood-altering substances.     Stay Connected with Others  Stay in touch with friends and family members.    Manage Your Mood  Try deep breathing, massage therapy, biofeedback, or meditation. Take part in fun activities when you can. Try to find something to smile about each day.     Psychotherapy  Be open to working with a therapist if your provider recommends it.     Medication  Be sure to take your medication as  prescribed. Most anti-depressants need to be taken every day. It usually takes several weeks for medications to work. Not all medicines work for all people. It is important to follow-up with your provider to make sure you have a treatment plan that is working for you. Do not stop your medication abruptly without first discussing it with your provider.    Crisis Resources   These hotlines are for both adults and children. They and are open 24 hours a day, 7 days a week unless noted otherwise.    National Suicide Prevention Lifeline   988 or 9-652-714-MRDP (9948)    Crisis Text Line    www.crisistextline.org  Text HOME to 428445 from anywhere in the [place], anytime, about any type of crisis. A live, trained crisis counselor will receive the text and respond quickly.    [facility] Lifeline for LGBTQ Youth  A national crisis intervention and suicide lifeline for LGBTQ youth under 25. Provides a safe place to talk without judgement. Call 1-564.562.6686; text START to 358525 or visit www.thetrevorproject.org to talk to a trained counselor.    For [facility] crisis numbers, visit the [facility] website at:  https://[place].gov/dhs/people-we-serve/adults/health-care/mental-health/resources/crisis-contacts.jsp

## 2025-01-06 NOTE — PROGRESS NOTES
"Preventive Care Visit  [facility]  Dodie Hebert NP, Family Medicine  Jan 6, 2025      Assessment & Plan     (Z00.00) Health maintenance examination  (primary encounter diagnosis)  Plan: Will update his Hep B vaccine today. Declines other vaccines. Lipid testing ordered. PSA at 50. Colonoscopy just done.    (F32.1) Current moderate episode of major depressive disorder without prior episode (H)  (F41.9) Anxiety  Comment: controlled  Plan: Continue Wellbutrin. F/up yearly.     (Z23) Need for vaccination  Plan: Will update Hep B vaccine.     (N18.31) Stage 3a chronic kidney disease (H)  Plan: Avoids NSAIDs and sodium. Recent BMP stable.       Patient has been advised of split billing requirements and indicates understanding: Yes        BMI  Estimated body mass index is 28.73 kg/m  as calculated from the following:    Height as of this encounter: 1.93 m (6' 4\").    Weight as of this encounter: 107 kg (236 lb).       Counseling  Appropriate preventive services were addressed with this patient via screening, questionnaire, or discussion as appropriate for fall prevention, nutrition, physical activity, Tobacco-use cessation, social engagement, weight loss and cognition.  Checklist reviewing preventive services available has been given to the patient.  Reviewed patient's diet, addressing concerns and/or questions.   He is at risk for psychosocial distress and has been provided with information to reduce risk.   The patient's PHQ-9 score is consistent with mild depression. He was provided with information regarding depression.           Sandoval Gallegos is a 34 year old, presenting for the following:  Physical, Kidney Problem, Depression, and Anxiety        1/6/2025     8:41 AM   Additional Questions   Roomed by eros guy   Accompanied by daughter         1/6/2025     8:41 AM   Patient Reported Additional Medications   Patient reports taking the following new medications none          HPI    Depression and " Anxiety   How are you doing with your depression since your last visit? No change  How are you doing with your anxiety since your last visit?  No change  Are you having other symptoms that might be associated with depression or anxiety? No  Have you had a significant life event? OTHER: birth of new baby     Do you have any concerns with your use of alcohol or other drugs? No  Infant not sleeping.   Taking Wellbutrin without side effects.  No thoughts of self harm.     Social History     Tobacco Use    Smoking status: Former     Current packs/day: 0.00     Average packs/day: 0.1 packs/day for 15.0 years (1.5 ttl pk-yrs)     Types: Pipe, Cigarettes     Start date: 2006     Quit date: 2020     Years since quittin.0    Smokeless tobacco: Never    Tobacco comments:     Pt denies quit plan 23   Vaping Use    Vaping status: Never Used   Substance Use Topics    Alcohol use: Yes     Comment: drinks once a week; 1-2    Drug use: Yes     Types: Marijuana     Comment: smokes         10/2/2024     1:00 PM 2024    10:08 AM 2025     8:35 AM   PHQ   PHQ-9 Total Score 7 2  7    Q9: Thoughts of better off dead/self-harm past 2 weeks Not at all Not at all Not at all       Patient-reported         2024    12:53 PM 10/2/2024     1:00 PM 2025     8:37 AM   JONAH-7 SCORE   Total Score   3 (minimal anxiety)   Total Score 0 7 3        Patient-reported         2025     8:35 AM   Last PHQ-9   1.  Little interest or pleasure in doing things 1   2.  Feeling down, depressed, or hopeless 1   3.  Trouble falling or staying asleep, or sleeping too much 2   4.  Feeling tired or having little energy 1   5.  Poor appetite or overeating 1   6.  Feeling bad about yourself 0   7.  Trouble concentrating 1   8.  Moving slowly or restless 0   Q9: Thoughts of better off dead/self-harm past 2 weeks 0   PHQ-9 Total Score 7        Patient-reported         2025     8:37 AM   JONAH-7    1. Feeling nervous, anxious, or on  edge 1   2. Not being able to stop or control worrying 0   3. Worrying too much about different things 0   4. Trouble relaxing 1   5. Being so restless that it is hard to sit still 0   6. Becoming easily annoyed or irritable 1   7. Feeling afraid, as if something awful might happen 0   JONAH-7 Total Score 3    If you checked any problems, how difficult have they made it for you to do your work, take care of things at home, or get along with other people? Not difficult at all       Patient-reported     956}  Chronic Kidney Disease Follow-up    Do you take any over the counter pain medicine?: No    Health Care Directive  Patient does not have a Health Care Directive: Discussed advance care planning with patient; however, patient declined at this time.        1/6/2025   General Health   How would you rate your overall physical health? Good   Feel stress (tense, anxious, or unable to sleep) To some extent   (!) STRESS CONCERN      1/6/2025   Nutrition   Three or more servings of calcium each day? (!) NO   Diet: Regular (no restrictions)   How many servings of fruit and vegetables per day? (!) 0-1   How many sweetened beverages each day? (!) 4+     Will start a daily multivitamin.         1/6/2025   Exercise   Days per week of moderate/strenous exercise 4 days         1/6/2025   Social Factors   Frequency of gathering with friends or relatives Once a week   Worry food won't last until get money to buy more No   Food not last or not have enough money for food? No   Do you have housing? (Housing is defined as stable permanent housing and does not include staying ouside in a car, in a tent, in an abandoned building, in an overnight shelter, or couch-surfing.) Yes   Are you worried about losing your housing? No   Lack of transportation? No   Unable to get utilities (heat,electricity)? No         1/6/2025   Dental   Dentist two times every year? Yes         1/6/2025   TB Screening   Were you born outside of the [place]? No        Today's PHQ-9 Score:       2025     8:35 AM   PHQ-9 SCORE   PHQ-9 Total Score MyChart 7 (Mild depression)   PHQ-9 Total Score 7        Patient-reported         2025   Substance Use   Alcohol more than 3/day or more than 7/wk No   Do you use any other substances recreationally? (!) CANNABIS PRODUCTS     Social History     Tobacco Use    Smoking status: Former     Current packs/day: 0.00     Average packs/day: 0.1 packs/day for 15.0 years (1.5 ttl pk-yrs)     Types: Pipe, Cigarettes     Start date: 2006     Quit date: 2020     Years since quittin.0    Smokeless tobacco: Never    Tobacco comments:     Pt denies quit plan 23   Vaping Use    Vaping status: Never Used   Substance Use Topics    Alcohol use: Yes     Comment: drinks once a week; 1-2    Drug use: Yes     Types: Marijuana     Comment: smokes           2025   STI Screening   New sexual partner(s) since last STI/HIV test? No       Reviewed and updated as needed this visit by Provider                    No past medical history on file.  Past Surgical History:   Procedure Laterality Date    COLONOSCOPY N/A 10/03/2024    Procedure: COLONOSCOPY;  Surgeon: Jonathan Jauregui MD;  Location: [facility] OR    growth removal[      left leg    HERNIA REPAIR  2019    Chely Boggo    VASECTOMY Bilateral 2024    Procedure: VASECTOMY;  Surgeon: Roberto Matt MD;  Location: [facility] OR         Review of Systems  CONSTITUTIONAL: NEGATIVE for fever, chills, change in weight  INTEGUMENTARY/SKIN: NEGATIVE for worrisome rashes, moles or lesions  EYES: NEGATIVE for vision changes or irritation  ENT/MOUTH: NEGATIVE for ear, mouth and throat problems  RESP: NEGATIVE for significant cough or SOB  CV: NEGATIVE for chest pain, palpitations or peripheral edema  GI: NEGATIVE for nausea, abdominal pain, heartburn, or change in bowel habits  : NEGATIVE for frequency, dysuria, or hematuria  MUSCULOSKELETAL: NEGATIVE for significant arthralgias or  "myalgia  NEURO: NEGATIVE for weakness, dizziness or paresthesias  ENDOCRINE: NEGATIVE for temperature intolerance, skin/hair changes  HEME: NEGATIVE for bleeding problems  PSYCHIATRIC: NEGATIVE for changes in mood or affect     Objective    Exam  /78 (BP Location: Right arm, Patient Position: Sitting, Cuff Size: Adult Large)   Pulse 80   Temp 97.1  F (36.2  C) (Tympanic)   Ht 1.93 m (6' 4\")   Wt 107 kg (236 lb)   SpO2 95%   BMI 28.73 kg/m     Estimated body mass index is 28.73 kg/m  as calculated from the following:    Height as of this encounter: 1.93 m (6' 4\").    Weight as of this encounter: 107 kg (236 lb).    Physical Exam  GENERAL: alert and no distress  EYES: Eyes grossly normal to inspection, PERRL and conjunctivae and sclerae normal  HENT: ear canals and TM's normal, nose and mouth without ulcers or lesions  NECK: no adenopathy, no asymmetry, masses, or scars  RESP: lungs clear to auscultation - no rales, rhonchi or wheezes  CV: regular rate and rhythm, normal S1 S2, no S3 or S4, no murmur, click or rub, no peripheral edema  ABDOMEN: soft, nontender, no hepatosplenomegaly, no masses and bowel sounds normal   (male): deferred; recently saw urology  MS: no gross musculoskeletal defects noted, no edema  SKIN: no suspicious lesions or rashes  NEURO: Normal strength and tone, mentation intact and speech normal  PSYCH: mentation appears normal, affect normal/bright        Signed Electronically by: Dodie Hebert NP    Answers submitted by the patient for this visit:  Adult Preventative Visit on 1/6/2025  9:00 AM with Dodie Hebert  Patient Health Questionnaire (Submitted on 1/6/2025)  If you checked off any problems, how difficult have these problems made it for you to do your work, take care of things at home, or get along with other people?: Somewhat difficult  PHQ9 TOTAL SCORE: 7  Patient Health Questionnaire (G7) (Submitted on 1/6/2025)  JONAH 7 TOTAL SCORE: 3    "

## 2025-01-26 ENCOUNTER — HOSPITAL ENCOUNTER (EMERGENCY)
Facility: HOSPITAL | Age: 35
Discharge: HOME | End: 2025-01-26
Attending: PHYSICIAN ASSISTANT | Admitting: PHYSICIAN ASSISTANT
Payer: COMMERCIAL

## 2025-01-26 VITALS
TEMPERATURE: 98.2 F | RESPIRATION RATE: 16 BRPM | SYSTOLIC BLOOD PRESSURE: 147 MMHG | HEART RATE: 87 BPM | DIASTOLIC BLOOD PRESSURE: 86 MMHG | WEIGHT: 220 LBS | OXYGEN SATURATION: 98 % | BODY MASS INDEX: 26.79 KG/M2 | HEIGHT: 76 IN

## 2025-01-26 DIAGNOSIS — T23.161A SUPERFICIAL BURN OF BACK OF RIGHT HAND, INITIAL ENCOUNTER: ICD-10-CM

## 2025-01-26 DIAGNOSIS — T25.229A SUPERFICIAL PARTIAL THICKNESS BURN OF FOOT: ICD-10-CM

## 2025-01-26 PROCEDURE — G0463 HOSPITAL OUTPT CLINIC VISIT: HCPCS

## 2025-01-26 PROCEDURE — 99213 OFFICE O/P EST LOW 20 MIN: CPT | Performed by: PHYSICIAN ASSISTANT

## 2025-01-26 RX ORDER — SILVER SULFADIAZINE 10 MG/G
CREAM TOPICAL ONCE
Status: DISCONTINUED | OUTPATIENT
Start: 2025-01-26 | End: 2025-01-26 | Stop reason: HOSPADM

## 2025-01-26 ASSESSMENT — ACTIVITIES OF DAILY LIVING (ADL): ADLS_ACUITY_SCORE: 41
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
## 2025-01-26 NOTE — ED NOTES
.LEANDRO Greene assessed patient in triage and determined patient Urgent Care appropriate. Will be seen in Urgent Care.
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
## 2025-01-27 ENCOUNTER — MYC MEDICAL ADVICE (OUTPATIENT)
Dept: FAMILY MEDICINE | Facility: OTHER | Age: 35
End: 2025-01-27

## 2025-01-27 DIAGNOSIS — T30.0 BURN: Primary | ICD-10-CM

## 2025-01-27 RX ORDER — SILVER SULFADIAZINE 10 MG/G
CREAM TOPICAL DAILY
Qty: 85 G | Refills: 0 | Status: SHIPPED | OUTPATIENT
Start: 2025-01-27

## 2025-01-28 ENCOUNTER — HOSPITAL ENCOUNTER (EMERGENCY)
Facility: HOSPITAL | Age: 35
Discharge: HOME | End: 2025-01-28
Attending: FAMILY MEDICINE
Payer: COMMERCIAL

## 2025-01-28 VITALS
OXYGEN SATURATION: 95 % | HEART RATE: 98 BPM | SYSTOLIC BLOOD PRESSURE: 154 MMHG | TEMPERATURE: 98.2 F | DIASTOLIC BLOOD PRESSURE: 118 MMHG | RESPIRATION RATE: 18 BRPM

## 2025-01-28 DIAGNOSIS — L03.115 CELLULITIS OF RIGHT LOWER EXTREMITY: ICD-10-CM

## 2025-01-28 DIAGNOSIS — T25.221D: ICD-10-CM

## 2025-01-28 PROCEDURE — 99283 EMERGENCY DEPT VISIT LOW MDM: CPT | Performed by: FAMILY MEDICINE

## 2025-01-28 PROCEDURE — 250N000011 HC RX IP 250 OP 636: Performed by: FAMILY MEDICINE

## 2025-01-28 PROCEDURE — 96372 THER/PROPH/DIAG INJ SC/IM: CPT | Performed by: FAMILY MEDICINE

## 2025-01-28 PROCEDURE — 99284 EMERGENCY DEPT VISIT MOD MDM: CPT

## 2025-01-28 RX ORDER — KETOROLAC TROMETHAMINE 30 MG/ML
30 INJECTION, SOLUTION INTRAMUSCULAR; INTRAVENOUS ONCE
Status: COMPLETED | OUTPATIENT
Start: 2025-01-28 | End: 2025-01-28

## 2025-01-28 RX ORDER — CEPHALEXIN 500 MG/1
500 CAPSULE ORAL 3 TIMES DAILY
Qty: 30 CAPSULE | Refills: 0 | Status: SHIPPED | OUTPATIENT
Start: 2025-01-28 | End: 2025-02-07

## 2025-01-28 RX ORDER — OXYCODONE HYDROCHLORIDE 5 MG/1
5 TABLET ORAL EVERY 6 HOURS PRN
Qty: 10 TABLET | Refills: 0 | Status: SHIPPED | OUTPATIENT
Start: 2025-01-28

## 2025-01-28 RX ADMIN — KETOROLAC TROMETHAMINE 30 MG: 30 INJECTION, SOLUTION INTRAMUSCULAR at 22:26

## 2025-01-28 ASSESSMENT — ACTIVITIES OF DAILY LIVING (ADL): ADLS_ACUITY_SCORE: 41

## 2025-01-28 ASSESSMENT — COLUMBIA-SUICIDE SEVERITY RATING SCALE - C-SSRS
2. HAVE YOU ACTUALLY HAD ANY THOUGHTS OF KILLING YOURSELF IN THE PAST MONTH?: NO
1. IN THE PAST MONTH, HAVE YOU WISHED YOU WERE DEAD OR WISHED YOU COULD GO TO SLEEP AND NOT WAKE UP?: NO
6. HAVE YOU EVER DONE ANYTHING, STARTED TO DO ANYTHING, OR PREPARED TO DO ANYTHING TO END YOUR LIFE?: NO

## 2025-01-28 NOTE — Clinical Note
Roberto Meza was seen and treated in our emergency department on 1/28/2025.  He may return to work on 02/03/2025.       If you have any questions or concerns, please don't hesitate to call.      Morena Aleman MD

## 2025-01-29 DIAGNOSIS — T30.0 BURN: Primary | ICD-10-CM
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
## 2025-01-29 NOTE — ED NOTES
Face to face report given with opportunity to observe patient.    Report given to MUNDO Jordan.    Brianna Rodriguez RN   1/28/2025  10:48 PM
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
## 2025-01-29 NOTE — ED NOTES
Wound cleansed and clinical image captured for Epic chart. Wound re dressed. Instructed on wound care and s/s worsening infection. Instructed on new prescriptions as well as safe use of oxycodone for pain control. Patient verbalizes understanding. Instructed to return with worsening symptoms/concerns.

## 2025-01-29 NOTE — DISCHARGE INSTRUCTIONS
Take tylenol 2 tabs every 6 hrs   You could take a couple of ibuprofen every 6 hrs if needed for a couple of days.  Take with food and drink water.   Oxycodone for more severe pain.  This can make you constipated, so take a stool softener when you take pain med.     Keflex oral antibiotic 3 x a day for 10 days     Ice your foot  Elevate your foot higher than your heart when sitting and at bedtime if possible   Ambulate as tolerated   Note to be off work until Monday.   Continue your dressing changes 1-2 times a day.  You don't have to put so much cream on and if you run out, just use antibiotic ointment.  Keeping clean and washing every day is important.     Follow up on Friday with your primary doctor for a wound check     Return to ER if new or worsening symptoms.

## 2025-01-30 ENCOUNTER — ANCILLARY PROCEDURE (OUTPATIENT)
Dept: GENERAL RADIOLOGY | Facility: OTHER | Age: 35
End: 2025-01-30
Attending: PODIATRIST
Payer: COMMERCIAL

## 2025-01-30 ENCOUNTER — OFFICE VISIT (OUTPATIENT)
Dept: PODIATRY | Facility: OTHER | Age: 35
End: 2025-01-30
Attending: PODIATRIST
Payer: COMMERCIAL

## 2025-01-30 VITALS
TEMPERATURE: 98.9 F | OXYGEN SATURATION: 97 % | HEART RATE: 75 BPM | DIASTOLIC BLOOD PRESSURE: 88 MMHG | SYSTOLIC BLOOD PRESSURE: 143 MMHG

## 2025-01-30 DIAGNOSIS — T25.221A PARTIAL THICKNESS BURN OF RIGHT FOOT, INITIAL ENCOUNTER: Primary | ICD-10-CM

## 2025-01-30 DIAGNOSIS — M79.671 RIGHT FOOT PAIN: ICD-10-CM

## 2025-01-30 PROCEDURE — 73630 X-RAY EXAM OF FOOT: CPT | Mod: TC | Performed by: RADIOLOGY

## 2025-01-30 ASSESSMENT — PAIN SCALES - GENERAL: PAINLEVEL_OUTOF10: MODERATE PAIN (5)
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
## 2025-02-13 ENCOUNTER — OFFICE VISIT (OUTPATIENT)
Dept: PODIATRY | Facility: OTHER | Age: 35
End: 2025-02-13
Attending: PODIATRIST
Payer: COMMERCIAL

## 2025-02-13 VITALS
SYSTOLIC BLOOD PRESSURE: 141 MMHG | OXYGEN SATURATION: 97 % | DIASTOLIC BLOOD PRESSURE: 89 MMHG | HEART RATE: 92 BPM | TEMPERATURE: 98.5 F

## 2025-02-13 DIAGNOSIS — T25.221D PARTIAL THICKNESS BURN OF RIGHT FOOT, SUBSEQUENT ENCOUNTER: Primary | ICD-10-CM

## 2025-02-13 DIAGNOSIS — L85.3 XEROSIS OF SKIN: ICD-10-CM

## 2025-02-13 ASSESSMENT — PAIN SCALES - GENERAL: PAINLEVEL_OUTOF10: NO PAIN (0)

## 2025-02-13 NOTE — LETTER
February 13, 2025      Roberto Meza  [address] [facility] [address]        To Whom It May Concern:    Roberto Meza was seen in our clinic on 02/13/2025. He may return to work without restrictions on 02/14/2025.      Sincerely,      Carolyn Lea      Electronically signed
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
## 2025-03-13 DIAGNOSIS — Z98.52 STATUS POST VASECTOMY: Primary | ICD-10-CM

## 2025-03-14 ENCOUNTER — LAB (OUTPATIENT)
Dept: LAB | Facility: OTHER | Age: 35
End: 2025-03-14
Payer: COMMERCIAL

## 2025-03-14 DIAGNOSIS — Z98.52 STATUS POST VASECTOMY: ICD-10-CM

## 2025-03-14 LAB
SPERM MOTILE SMN QL MICRO: NORMAL
SPERM P VAS SMN QL MICRO: NORMAL

## 2025-05-06 NOTE — ED TRIAGE NOTES
Called pt to schedule a lab appt and follow up visit. Was unable to reach pt so left VM to call the office.   Thinks has infection from where Novocain was injection on 4/20/22 upper left

## 2025-07-01 NOTE — PROGRESS NOTES
"  Assessment & Plan     (F32.1) Current moderate episode of major depressive disorder without prior episode (H)  (primary encounter diagnosis)  (F41.9) Anxiety  Comment: controlled  Plan: buPROPion (WELLBUTRIN XL) 150 MG 24 hr tablet        C/W Wellbutrin.     (N18.31) Stage 3a chronic kidney disease (H)  Comment: avoids NSAIDs  Plan: Basic metabolic panel, CBC with platelets and         differential, Vitamin D Deficiency, Albumin         Random Urine Quantitative with Creat Ratio        Will update labs. Will also work on limiting his sodium intake. Will notify patient of the results when available and intervene accordingly.           BMI  Estimated body mass index is 29.69 kg/m  as calculated from the following:    Height as of 25: 1.93 m (6' 4\").    Weight as of this encounter: 110.6 kg (243 lb 14.4 oz).       The longitudinal plan of care for the diagnosis(es)/condition(s) as documented were addressed during this visit. Due to the added complexity in care, I will continue to support El in the subsequent management and with ongoing continuity of care.    Sandoval Gallegos is a 34 year old, presenting for the following health issues:  Recheck Medication    HPI      Depression and Anxiety   How are you doing with your depression since your last visit? No change - stable  How are you doing with your anxiety since your last visit?  No change - stable  Are you having other symptoms that might be associated with depression or anxiety? No  Have you had a significant life event? No   Do you have any concerns with your use of alcohol or other drugs? No  No thoughts of self harm.   Taking Wellbutrin without side effects.     Social History     Tobacco Use    Smoking status: Former     Current packs/day: 0.00     Average packs/day: 0.1 packs/day for 15.0 years (1.5 ttl pk-yrs)     Types: Pipe, Cigarettes     Start date: 2006     Quit date: 2020     Years since quittin.5     Passive exposure: Past    " Smokeless tobacco: Never    Tobacco comments:     Pt denies quit plan 9/18/23   Vaping Use    Vaping status: Never Used   Substance Use Topics    Alcohol use: Yes     Comment: drinks once a week; 1-2    Drug use: Yes     Types: Marijuana     Comment: smokes         11/8/2024    10:08 AM 1/6/2025     8:35 AM 7/8/2025     8:46 AM   PHQ   PHQ-9 Total Score 2  7  3    Q9: Thoughts of better off dead/self-harm past 2 weeks Not at all Not at all Not at all       Patient-reported         10/2/2024     1:00 PM 1/6/2025     8:37 AM 7/8/2025     8:47 AM   JONAH-7 SCORE   Total Score  3 (minimal anxiety) 1 (minimal anxiety)   Total Score 7 3  1        Patient-reported         7/8/2025     8:46 AM   Last PHQ-9   1.  Little interest or pleasure in doing things 1   2.  Feeling down, depressed, or hopeless 0   3.  Trouble falling or staying asleep, or sleeping too much 1   4.  Feeling tired or having little energy 0   5.  Poor appetite or overeating 1   6.  Feeling bad about yourself 0   7.  Trouble concentrating 0   8.  Moving slowly or restless 0   Q9: Thoughts of better off dead/self-harm past 2 weeks 0   PHQ-9 Total Score 3        Patient-reported         7/8/2025     8:47 AM   JONAH-7    1. Feeling nervous, anxious, or on edge 0   2. Not being able to stop or control worrying 0   3. Worrying too much about different things 0   4. Trouble relaxing 0   5. Being so restless that it is hard to sit still 0   6. Becoming easily annoyed or irritable 1   7. Feeling afraid, as if something awful might happen 0   JONAH-7 Total Score 1    If you checked any problems, how difficult have they made it for you to do your work, take care of things at home, or get along with other people? Not difficult at all       Patient-reported       6}  Chronic Kidney Disease Follow-up  Do you take any over the counter pain medicine?: No    Due for third Hep B vaccine. Agreeable.     How many servings of fruits and vegetables do you eat daily?  2-3  On  average, how many sweetened beverages do you drink each day (Examples: soda, juice, sweet tea, etc.  Do NOT count diet or artificially sweetened beverages)?   6  How many days per week do you exercise enough to make your heart beat faster? 3 or less  How many minutes a day do you exercise enough to make your heart beat faster? 10 - 19  How many days per week do you miss taking your medication? 2  What makes it hard for you to take your medications?  Rotating work schedule        Review of Systems  Constitutional, HEENT, cardiovascular, pulmonary, gi and gu systems are negative, except as otherwise noted.      Objective    /83 (BP Location: Left arm, Patient Position: Sitting, Cuff Size: Adult Large)   Pulse 87   Temp 98  F (36.7  C) (Tympanic)   Resp 20   Wt 110.6 kg (243 lb 14.4 oz)   SpO2 95%   BMI 29.69 kg/m    Body mass index is 29.69 kg/m .  Physical Exam   GENERAL: alert and no distress  EYES: Eyes grossly normal to inspection, PERRL and conjunctivae and sclerae normal  HENT: ear canals and TM's normal, nose and mouth without ulcers or lesions  NECK: no adenopathy, no asymmetry, masses, or scars  RESP: lungs clear to auscultation - no rales, rhonchi or wheezes  CV: regular rate and rhythm, no murmur, click or rub, no peripheral edema  ABDOMEN: soft, nontender, no hepatosplenomegaly, no masses and bowel sounds normal  MS: no gross musculoskeletal defects noted, no edema  NEURO: Normal strength and tone, mentation intact and speech normal  PSYCH: mentation appears normal, affect normal/bright    Labs in process        Signed Electronically by: Dodie Hebert NP

## 2025-07-08 ENCOUNTER — TELEPHONE (OUTPATIENT)
Dept: CARE COORDINATION | Facility: OTHER | Age: 35
End: 2025-07-08

## 2025-07-08 ENCOUNTER — OFFICE VISIT (OUTPATIENT)
Dept: FAMILY MEDICINE | Facility: OTHER | Age: 35
End: 2025-07-08
Attending: NURSE PRACTITIONER
Payer: COMMERCIAL

## 2025-07-08 VITALS
RESPIRATION RATE: 20 BRPM | BODY MASS INDEX: 29.69 KG/M2 | HEART RATE: 87 BPM | OXYGEN SATURATION: 95 % | TEMPERATURE: 98 F | WEIGHT: 243.9 LBS | DIASTOLIC BLOOD PRESSURE: 83 MMHG | SYSTOLIC BLOOD PRESSURE: 118 MMHG

## 2025-07-08 DIAGNOSIS — N18.31 STAGE 3A CHRONIC KIDNEY DISEASE (H): ICD-10-CM

## 2025-07-08 DIAGNOSIS — Z23 NEED FOR VACCINATION: ICD-10-CM

## 2025-07-08 DIAGNOSIS — F41.9 ANXIETY: ICD-10-CM

## 2025-07-08 DIAGNOSIS — F32.1 CURRENT MODERATE EPISODE OF MAJOR DEPRESSIVE DISORDER WITHOUT PRIOR EPISODE (H): Primary | ICD-10-CM

## 2025-07-08 LAB
ANION GAP SERPL CALCULATED.3IONS-SCNC: 16 MMOL/L (ref 7–15)
BASOPHILS # BLD AUTO: 0 10E3/UL (ref 0–0.2)
BASOPHILS NFR BLD AUTO: 1 %
BUN SERPL-MCNC: 22.6 MG/DL (ref 6–20)
CALCIUM SERPL-MCNC: 9.8 MG/DL (ref 8.8–10.4)
CHLORIDE SERPL-SCNC: 102 MMOL/L (ref 98–107)
CREAT SERPL-MCNC: 1.32 MG/DL (ref 0.67–1.17)
CREAT UR-MCNC: 155.3 MG/DL
EGFRCR SERPLBLD CKD-EPI 2021: 72 ML/MIN/1.73M2
EOSINOPHIL # BLD AUTO: 0.2 10E3/UL (ref 0–0.7)
EOSINOPHIL NFR BLD AUTO: 3 %
ERYTHROCYTE [DISTWIDTH] IN BLOOD BY AUTOMATED COUNT: 12.1 % (ref 10–15)
GLUCOSE SERPL-MCNC: 105 MG/DL (ref 70–99)
HCO3 SERPL-SCNC: 20 MMOL/L (ref 22–29)
HCT VFR BLD AUTO: 43.8 % (ref 40–53)
HGB BLD-MCNC: 16.3 G/DL (ref 13.3–17.7)
IMM GRANULOCYTES # BLD: 0 10E3/UL
IMM GRANULOCYTES NFR BLD: 0 %
LYMPHOCYTES # BLD AUTO: 1.5 10E3/UL (ref 0.8–5.3)
LYMPHOCYTES NFR BLD AUTO: 30 %
MCH RBC QN AUTO: 32 PG (ref 26.5–33)
MCHC RBC AUTO-ENTMCNC: 37.2 G/DL (ref 31.5–36.5)
MCV RBC AUTO: 86 FL (ref 78–100)
MICROALBUMIN UR-MCNC: <12 MG/L
MICROALBUMIN/CREAT UR: NORMAL MG/G{CREAT}
MONOCYTES # BLD AUTO: 0.4 10E3/UL (ref 0–1.3)
MONOCYTES NFR BLD AUTO: 8 %
NEUTROPHILS # BLD AUTO: 2.9 10E3/UL (ref 1.6–8.3)
NEUTROPHILS NFR BLD AUTO: 58 %
NRBC # BLD AUTO: 0 10E3/UL
NRBC BLD AUTO-RTO: 0 /100
PLATELET # BLD AUTO: 230 10E3/UL (ref 150–450)
POTASSIUM SERPL-SCNC: 4.6 MMOL/L (ref 3.4–5.3)
RBC # BLD AUTO: 5.09 10E6/UL (ref 4.4–5.9)
SODIUM SERPL-SCNC: 138 MMOL/L (ref 135–145)
WBC # BLD AUTO: 5 10E3/UL (ref 4–11)

## 2025-07-08 RX ORDER — BUPROPION HYDROCHLORIDE 150 MG/1
150 TABLET ORAL EVERY MORNING
Qty: 90 TABLET | Refills: 3 | Status: SHIPPED | OUTPATIENT
Start: 2025-07-08

## 2025-07-08 ASSESSMENT — PATIENT HEALTH QUESTIONNAIRE - PHQ9
SUM OF ALL RESPONSES TO PHQ QUESTIONS 1-9: 3
SUM OF ALL RESPONSES TO PHQ QUESTIONS 1-9: 3

## 2025-07-08 ASSESSMENT — ANXIETY QUESTIONNAIRES
4. TROUBLE RELAXING: NOT AT ALL
7. FEELING AFRAID AS IF SOMETHING AWFUL MIGHT HAPPEN: NOT AT ALL
7. FEELING AFRAID AS IF SOMETHING AWFUL MIGHT HAPPEN: NOT AT ALL
GAD7 TOTAL SCORE: 1
2. NOT BEING ABLE TO STOP OR CONTROL WORRYING: NOT AT ALL
3. WORRYING TOO MUCH ABOUT DIFFERENT THINGS: NOT AT ALL
GAD7 TOTAL SCORE: 1
6. BECOMING EASILY ANNOYED OR IRRITABLE: SEVERAL DAYS
5. BEING SO RESTLESS THAT IT IS HARD TO SIT STILL: NOT AT ALL
GAD7 TOTAL SCORE: 1
8. IF YOU CHECKED OFF ANY PROBLEMS, HOW DIFFICULT HAVE THESE MADE IT FOR YOU TO DO YOUR WORK, TAKE CARE OF THINGS AT HOME, OR GET ALONG WITH OTHER PEOPLE?: NOT DIFFICULT AT ALL
1. FEELING NERVOUS, ANXIOUS, OR ON EDGE: NOT AT ALL
IF YOU CHECKED OFF ANY PROBLEMS ON THIS QUESTIONNAIRE, HOW DIFFICULT HAVE THESE PROBLEMS MADE IT FOR YOU TO DO YOUR WORK, TAKE CARE OF THINGS AT HOME, OR GET ALONG WITH OTHER PEOPLE: NOT DIFFICULT AT ALL

## 2025-07-08 ASSESSMENT — PAIN SCALES - GENERAL: PAINLEVEL_OUTOF10: NO PAIN (0)

## 2025-07-08 NOTE — TELEPHONE ENCOUNTER
Received call from lab that patient's potassium level came back at 5.1 but sample was hemolyzed. Wondering if provider wants this result pushed through or have patient re-drawn. Spoke with PCP. Wants lab re-drawn. Notified lab. They will put in order to have him re-drawn and clinic lab will notify patient to come in and be re-drawn.

## (undated) DEVICE — GLOVE PROTEXIS POWDER FREE CLSC 7.5  2D72PL75X

## (undated) DEVICE — LABEL STERILE PREPRINTED FOR OR FRRH01-2M

## (undated) DEVICE — Device

## (undated) DEVICE — SUCTION MANIFOLD NEPTUNE 2 SYS 1 PORT 702-025-000

## (undated) DEVICE — CANISTER SUCTION MEDI-VAC GUARDIAN 2000ML 90D 65651-220

## (undated) DEVICE — SOL NACL 0.9% IRRIG 1000ML BOTTLE 2F7124

## (undated) DEVICE — TRAY SKIN PREP POVIDONE/IODINE DYND70372

## (undated) DEVICE — TUBING SUCTION 20FT N620A

## (undated) DEVICE — SOL WATER IRRIG 1000ML BOTTLE 2F7114

## (undated) DEVICE — CONNECTOR ERBEFLO 2 PORT 20325-215

## (undated) DEVICE — PACK LAPAROTOMY CUSTOM SBA32LPMBG

## (undated) DEVICE — PANTIES MESH LG/XLG 2PK 706M2

## (undated) DEVICE — SYR 10ML FINGER CONTROL W/O NDL 309695

## (undated) DEVICE — PACK BASIN SET UP SUTCNBSBBA

## (undated) DEVICE — DRSG GAUZE 4X4" 3033

## (undated) DEVICE — ESU GROUND PAD ADULT W/CORD E7507

## (undated) RX ORDER — PROPOFOL 10 MG/ML
INJECTION, EMULSION INTRAVENOUS
Status: DISPENSED
Start: 2024-10-03